# Patient Record
Sex: FEMALE | Race: WHITE | HISPANIC OR LATINO | URBAN - METROPOLITAN AREA
[De-identification: names, ages, dates, MRNs, and addresses within clinical notes are randomized per-mention and may not be internally consistent; named-entity substitution may affect disease eponyms.]

---

## 2023-04-07 PROBLEM — Z00.00 ENCOUNTER FOR PREVENTIVE HEALTH EXAMINATION: Status: ACTIVE | Noted: 2023-04-07

## 2024-04-03 ENCOUNTER — INPATIENT (INPATIENT)
Facility: HOSPITAL | Age: 79
LOS: 2 days | Discharge: HOME | DRG: 194 | End: 2024-04-06
Attending: STUDENT IN AN ORGANIZED HEALTH CARE EDUCATION/TRAINING PROGRAM | Admitting: STUDENT IN AN ORGANIZED HEALTH CARE EDUCATION/TRAINING PROGRAM
Payer: MEDICARE

## 2024-04-03 VITALS
WEIGHT: 134.48 LBS | DIASTOLIC BLOOD PRESSURE: 93 MMHG | SYSTOLIC BLOOD PRESSURE: 159 MMHG | HEART RATE: 54 BPM | TEMPERATURE: 98 F | RESPIRATION RATE: 18 BRPM | OXYGEN SATURATION: 97 %

## 2024-04-03 DIAGNOSIS — S22.39XA FRACTURE OF ONE RIB, UNSPECIFIED SIDE, INITIAL ENCOUNTER FOR CLOSED FRACTURE: ICD-10-CM

## 2024-04-03 DIAGNOSIS — E78.5 HYPERLIPIDEMIA, UNSPECIFIED: ICD-10-CM

## 2024-04-03 DIAGNOSIS — I10 ESSENTIAL (PRIMARY) HYPERTENSION: ICD-10-CM

## 2024-04-03 DIAGNOSIS — F41.9 ANXIETY DISORDER, UNSPECIFIED: ICD-10-CM

## 2024-04-03 DIAGNOSIS — Z94.81 BONE MARROW TRANSPLANT STATUS: Chronic | ICD-10-CM

## 2024-04-03 DIAGNOSIS — S22.49XA MULTIPLE FRACTURES OF RIBS, UNSPECIFIED SIDE, INITIAL ENCOUNTER FOR CLOSED FRACTURE: ICD-10-CM

## 2024-04-03 DIAGNOSIS — Z29.9 ENCOUNTER FOR PROPHYLACTIC MEASURES, UNSPECIFIED: ICD-10-CM

## 2024-04-03 DIAGNOSIS — J18.9 PNEUMONIA, UNSPECIFIED ORGANISM: ICD-10-CM

## 2024-04-03 DIAGNOSIS — F03.90 UNSPECIFIED DEMENTIA WITHOUT BEHAVIORAL DISTURBANCE: ICD-10-CM

## 2024-04-03 LAB
ALBUMIN SERPL ELPH-MCNC: 3.5 G/DL — SIGNIFICANT CHANGE UP (ref 3.5–5)
ALP SERPL-CCNC: 104 U/L — SIGNIFICANT CHANGE UP (ref 40–120)
ALT FLD-CCNC: 24 U/L DA — SIGNIFICANT CHANGE UP (ref 10–60)
ANION GAP SERPL CALC-SCNC: 4 MMOL/L — LOW (ref 5–17)
AST SERPL-CCNC: 19 U/L — SIGNIFICANT CHANGE UP (ref 10–40)
BASOPHILS # BLD AUTO: 0.07 K/UL — SIGNIFICANT CHANGE UP (ref 0–0.2)
BASOPHILS NFR BLD AUTO: 0.5 % — SIGNIFICANT CHANGE UP (ref 0–2)
BILIRUB SERPL-MCNC: 0.7 MG/DL — SIGNIFICANT CHANGE UP (ref 0.2–1.2)
BUN SERPL-MCNC: 21 MG/DL — HIGH (ref 7–18)
CALCIUM SERPL-MCNC: 9.7 MG/DL — SIGNIFICANT CHANGE UP (ref 8.4–10.5)
CHLORIDE SERPL-SCNC: 102 MMOL/L — SIGNIFICANT CHANGE UP (ref 96–108)
CK SERPL-CCNC: 61 U/L — SIGNIFICANT CHANGE UP (ref 21–215)
CO2 SERPL-SCNC: 29 MMOL/L — SIGNIFICANT CHANGE UP (ref 22–31)
CREAT SERPL-MCNC: 0.94 MG/DL — SIGNIFICANT CHANGE UP (ref 0.5–1.3)
EGFR: 61 ML/MIN/1.73M2 — SIGNIFICANT CHANGE UP
EOSINOPHIL # BLD AUTO: 0.04 K/UL — SIGNIFICANT CHANGE UP (ref 0–0.5)
EOSINOPHIL NFR BLD AUTO: 0.3 % — SIGNIFICANT CHANGE UP (ref 0–6)
GLUCOSE SERPL-MCNC: 105 MG/DL — HIGH (ref 70–99)
HCT VFR BLD CALC: 44.6 % — SIGNIFICANT CHANGE UP (ref 34.5–45)
HGB BLD-MCNC: 15 G/DL — SIGNIFICANT CHANGE UP (ref 11.5–15.5)
IMM GRANULOCYTES NFR BLD AUTO: 0.3 % — SIGNIFICANT CHANGE UP (ref 0–0.9)
LYMPHOCYTES # BLD AUTO: 25.3 % — SIGNIFICANT CHANGE UP (ref 13–44)
LYMPHOCYTES # BLD AUTO: 3.76 K/UL — HIGH (ref 1–3.3)
MCHC RBC-ENTMCNC: 31.6 PG — SIGNIFICANT CHANGE UP (ref 27–34)
MCHC RBC-ENTMCNC: 33.6 GM/DL — SIGNIFICANT CHANGE UP (ref 32–36)
MCV RBC AUTO: 94.1 FL — SIGNIFICANT CHANGE UP (ref 80–100)
MONOCYTES # BLD AUTO: 1.36 K/UL — HIGH (ref 0–0.9)
MONOCYTES NFR BLD AUTO: 9.1 % — SIGNIFICANT CHANGE UP (ref 2–14)
NEUTROPHILS # BLD AUTO: 9.59 K/UL — HIGH (ref 1.8–7.4)
NEUTROPHILS NFR BLD AUTO: 64.5 % — SIGNIFICANT CHANGE UP (ref 43–77)
NRBC # BLD: 0 /100 WBCS — SIGNIFICANT CHANGE UP (ref 0–0)
PLATELET # BLD AUTO: 286 K/UL — SIGNIFICANT CHANGE UP (ref 150–400)
POTASSIUM SERPL-MCNC: 4.2 MMOL/L — SIGNIFICANT CHANGE UP (ref 3.5–5.3)
POTASSIUM SERPL-SCNC: 4.2 MMOL/L — SIGNIFICANT CHANGE UP (ref 3.5–5.3)
PROT SERPL-MCNC: 8 G/DL — SIGNIFICANT CHANGE UP (ref 6–8.3)
RBC # BLD: 4.74 M/UL — SIGNIFICANT CHANGE UP (ref 3.8–5.2)
RBC # FLD: 15 % — HIGH (ref 10.3–14.5)
SODIUM SERPL-SCNC: 135 MMOL/L — SIGNIFICANT CHANGE UP (ref 135–145)
TROPONIN I, HIGH SENSITIVITY RESULT: 10.8 NG/L — SIGNIFICANT CHANGE UP
WBC # BLD: 14.87 K/UL — HIGH (ref 3.8–10.5)
WBC # FLD AUTO: 14.87 K/UL — HIGH (ref 3.8–10.5)

## 2024-04-03 PROCEDURE — 71250 CT THORAX DX C-: CPT | Mod: 26,MC

## 2024-04-03 PROCEDURE — 99223 1ST HOSP IP/OBS HIGH 75: CPT | Mod: GC,AI

## 2024-04-03 PROCEDURE — 72125 CT NECK SPINE W/O DYE: CPT | Mod: 26,MC

## 2024-04-03 PROCEDURE — 99285 EMERGENCY DEPT VISIT HI MDM: CPT

## 2024-04-03 PROCEDURE — 70450 CT HEAD/BRAIN W/O DYE: CPT | Mod: 26,MC

## 2024-04-03 RX ORDER — ENOXAPARIN SODIUM 100 MG/ML
40 INJECTION SUBCUTANEOUS EVERY 24 HOURS
Refills: 0 | Status: DISCONTINUED | OUTPATIENT
Start: 2024-04-03 | End: 2024-04-06

## 2024-04-03 RX ORDER — ACETAMINOPHEN 500 MG
1000 TABLET ORAL ONCE
Refills: 0 | Status: COMPLETED | OUTPATIENT
Start: 2024-04-03 | End: 2024-04-03

## 2024-04-03 RX ORDER — METOPROLOL TARTRATE 50 MG
12.5 TABLET ORAL EVERY 12 HOURS
Refills: 0 | Status: DISCONTINUED | OUTPATIENT
Start: 2024-04-03 | End: 2024-04-03

## 2024-04-03 RX ORDER — LIDOCAINE 4 G/100G
1 CREAM TOPICAL DAILY
Refills: 0 | Status: DISCONTINUED | OUTPATIENT
Start: 2024-04-03 | End: 2024-04-06

## 2024-04-03 RX ORDER — OXYCODONE HYDROCHLORIDE 5 MG/1
5 TABLET ORAL EVERY 4 HOURS
Refills: 0 | Status: DISCONTINUED | OUTPATIENT
Start: 2024-04-03 | End: 2024-04-05

## 2024-04-03 RX ORDER — OXYCODONE HYDROCHLORIDE 5 MG/1
2.5 TABLET ORAL EVERY 4 HOURS
Refills: 0 | Status: DISCONTINUED | OUTPATIENT
Start: 2024-04-03 | End: 2024-04-04

## 2024-04-03 RX ORDER — AZITHROMYCIN 500 MG/1
500 TABLET, FILM COATED ORAL ONCE
Refills: 0 | Status: COMPLETED | OUTPATIENT
Start: 2024-04-03 | End: 2024-04-03

## 2024-04-03 RX ORDER — POLYETHYLENE GLYCOL 3350 17 G/17G
17 POWDER, FOR SOLUTION ORAL DAILY
Refills: 0 | Status: DISCONTINUED | OUTPATIENT
Start: 2024-04-03 | End: 2024-04-06

## 2024-04-03 RX ORDER — METOPROLOL TARTRATE 50 MG
12.5 TABLET ORAL EVERY 12 HOURS
Refills: 0 | Status: DISCONTINUED | OUTPATIENT
Start: 2024-04-03 | End: 2024-04-06

## 2024-04-03 RX ORDER — NALOXONE HYDROCHLORIDE 4 MG/.1ML
0.4 SPRAY NASAL ONCE
Refills: 0 | Status: DISCONTINUED | OUTPATIENT
Start: 2024-04-03 | End: 2024-04-06

## 2024-04-03 RX ORDER — CYCLOBENZAPRINE HYDROCHLORIDE 10 MG/1
5 TABLET, FILM COATED ORAL EVERY 8 HOURS
Refills: 0 | Status: DISCONTINUED | OUTPATIENT
Start: 2024-04-03 | End: 2024-04-03

## 2024-04-03 RX ORDER — SENNA PLUS 8.6 MG/1
2 TABLET ORAL AT BEDTIME
Refills: 0 | Status: DISCONTINUED | OUTPATIENT
Start: 2024-04-03 | End: 2024-04-06

## 2024-04-03 RX ORDER — ATORVASTATIN CALCIUM 80 MG/1
40 TABLET, FILM COATED ORAL AT BEDTIME
Refills: 0 | Status: DISCONTINUED | OUTPATIENT
Start: 2024-04-03 | End: 2024-04-06

## 2024-04-03 RX ORDER — ACETAMINOPHEN 500 MG
1000 TABLET ORAL EVERY 8 HOURS
Refills: 0 | Status: DISCONTINUED | OUTPATIENT
Start: 2024-04-03 | End: 2024-04-06

## 2024-04-03 RX ORDER — CEFTRIAXONE 500 MG/1
1000 INJECTION, POWDER, FOR SOLUTION INTRAMUSCULAR; INTRAVENOUS EVERY 24 HOURS
Refills: 0 | Status: DISCONTINUED | OUTPATIENT
Start: 2024-04-04 | End: 2024-04-06

## 2024-04-03 RX ORDER — INSULIN LISPRO 100/ML
VIAL (ML) SUBCUTANEOUS
Refills: 0 | Status: DISCONTINUED | OUTPATIENT
Start: 2024-04-03 | End: 2024-04-03

## 2024-04-03 RX ORDER — AZITHROMYCIN 500 MG/1
500 TABLET, FILM COATED ORAL EVERY 24 HOURS
Refills: 0 | Status: DISCONTINUED | OUTPATIENT
Start: 2024-04-04 | End: 2024-04-06

## 2024-04-03 RX ORDER — SODIUM CHLORIDE 9 MG/ML
1000 INJECTION, SOLUTION INTRAVENOUS
Refills: 0 | Status: DISCONTINUED | OUTPATIENT
Start: 2024-04-03 | End: 2024-04-06

## 2024-04-03 RX ORDER — CEFTRIAXONE 500 MG/1
1000 INJECTION, POWDER, FOR SOLUTION INTRAMUSCULAR; INTRAVENOUS ONCE
Refills: 0 | Status: COMPLETED | OUTPATIENT
Start: 2024-04-03 | End: 2024-04-03

## 2024-04-03 RX ORDER — MORPHINE SULFATE 50 MG/1
2 CAPSULE, EXTENDED RELEASE ORAL ONCE
Refills: 0 | Status: DISCONTINUED | OUTPATIENT
Start: 2024-04-03 | End: 2024-04-03

## 2024-04-03 RX ADMIN — Medication 400 MILLIGRAM(S): at 16:49

## 2024-04-03 RX ADMIN — Medication 1000 MILLIGRAM(S): at 16:50

## 2024-04-03 RX ADMIN — CEFTRIAXONE 100 MILLIGRAM(S): 500 INJECTION, POWDER, FOR SOLUTION INTRAMUSCULAR; INTRAVENOUS at 18:33

## 2024-04-03 RX ADMIN — MORPHINE SULFATE 2 MILLIGRAM(S): 50 CAPSULE, EXTENDED RELEASE ORAL at 18:50

## 2024-04-03 RX ADMIN — Medication 1000 MILLIGRAM(S): at 22:50

## 2024-04-03 RX ADMIN — MORPHINE SULFATE 2 MILLIGRAM(S): 50 CAPSULE, EXTENDED RELEASE ORAL at 18:32

## 2024-04-03 RX ADMIN — AZITHROMYCIN 255 MILLIGRAM(S): 500 TABLET, FILM COATED ORAL at 18:33

## 2024-04-03 RX ADMIN — ATORVASTATIN CALCIUM 40 MILLIGRAM(S): 80 TABLET, FILM COATED ORAL at 22:50

## 2024-04-03 RX ADMIN — SENNA PLUS 2 TABLET(S): 8.6 TABLET ORAL at 22:50

## 2024-04-03 NOTE — H&P ADULT - PROBLEM SELECTOR PLAN 3
History of hypertension  As per HHA patient takes metoprolol 25 mg daily  Will hold home medications for now  Primary team to confirm home meds

## 2024-04-03 NOTE — ED ADULT TRIAGE NOTE - LOCATION:
Pt arrives to ED with a cough and watery eyes that began on Sunday, pt thinks it's allergy related but wants to make sure she doesn't have covid, pt denies fever, sob, N/V/D or loss of taste/smell  
Left arm;

## 2024-04-03 NOTE — H&P ADULT - NSHPPHYSICALEXAM_GEN_ALL_CORE
GENERAL: NAD, well-groomed, well-developed, elderly female  HEAD:  Atraumatic, Normocephalic  EYES: EOMI, PERRLA, conjunctiva and sclera clear  ENMT: No tonsillar erythema, exudates, or enlargement; Moist mucous membranes, Good dentition, No lesions  NECK: Supple, normal appearance, No JVD; Normal thyroid; Trachea midline  NERVOUS SYSTEM:  Alert & Oriented X3,  Motor Strength 5/5 B/L upper and lower extremities, sensation intact  CHEST/LUNG: Lungs with crackles and lung fields bilaterally, No rales, rhonchi, wheezing, Tenderness to palpation in the posterior and lateral left sided rib cage  HEART: Regular rate and rhythm; No murmurs, rubs, or gallops  ABDOMEN: Soft, Nontender, Nondistended; Bowel sounds present  EXTREMITIES:  2+ Peripheral Pulses, No clubbing, cyanosis, or edema  LYMPH: No lymphadenopathy noted  SKIN: No rashes or lesions;  Good capillary refill

## 2024-04-03 NOTE — H&P ADULT - PROBLEM SELECTOR PLAN 1
p/w cough, Sputum production, WBC 14  CT Chest shows Multifocal subsegmental consolidative changes with more prominent changes in the left lower lobe with associated areas of mucous plugging which although may be chronic in nature.   s/p Ceftriaxone, azithromycin, Tylenol, morphine in the ED  Start azithromycin 500mg qd + rocephin 1g qd  Send strep ag, legionella, RVP, procalcitonin, mycoplasma igm  tylenol prn  Oxygen supplementation as needed  Obtain ambulatory O2 sat

## 2024-04-03 NOTE — H&P ADULT - PROBLEM SELECTOR PLAN 4
History of hyperlipidemia  As per HHA patient takes atorvastatin 40 mg daily  Continue with home meds  Primary team to confirm home meds  Diet DASH

## 2024-04-03 NOTE — H&P ADULT - NSHPREVIEWOFSYSTEMS_GEN_ALL_CORE
CONSTITUTIONAL: No fever, weight loss, or fatigue  EYES: No eye pain, visual disturbances, or discharge  ENT:  No difficulty hearing, tinnitus, vertigo; No sinus or throat pain  NECK: No pain or stiffness  RESPIRATORY: No cough, wheezing, chills or hemoptysis; No Shortness of Breath, Difficulty breathing due to pain  CARDIOVASCULAR: No chest pain, palpitations, passing out, dizziness, or leg swelling  GASTROINTESTINAL: No abdominal or epigastric pain. No nausea, vomiting, or hematemesis; No diarrhea or constipation. No melena or hematochezia.  GENITOURINARY: No dysuria, frequency, hematuria, or incontinence  NEUROLOGICAL: No headaches, memory loss, loss of strength, numbness, or tremors  SKIN: No itching, burning, rashes, or lesions   LYMPH Nodes: No enlarged glands  ENDOCRINE: No heat or cold intolerance; No hair loss  MUSCULOSKELETAL: No joint pain or swelling; No muscle, back, No extremity pain, Left posterior and lateral rib cage pain  PSYCHIATRIC: No depression, anxiety, mood swings, or difficulty sleeping  HEME/LYMPH: No easy bruising, or bleeding gums  ALLERGY AND IMMUNOLOGIC: No hives or eczema

## 2024-04-03 NOTE — ED PROVIDER NOTE - CARE PLAN
1 Principal Discharge DX:	Chest wall pain   Principal Discharge DX:	Rib fractures  Secondary Diagnosis:	Pneumonia  Secondary Diagnosis:	Hypoxia  Secondary Diagnosis:	Intractable pain

## 2024-04-03 NOTE — ED PROVIDER NOTE - PROGRESS NOTE DETAILS
3 displaced rib fx, O2 sat 92% on room air, pneumonia on CT scan. admit for IV antibiotics pain control, spirometery

## 2024-04-03 NOTE — ED ADULT NURSE NOTE - NSFALLHARMRISKINTERV_ED_ALL_ED

## 2024-04-03 NOTE — H&P ADULT - PROBLEM SELECTOR PLAN 5
History of anxiety and depression  HHA unaware of medications at this time  Primary team to confirm home meds

## 2024-04-03 NOTE — H&P ADULT - HISTORY OF PRESENT ILLNESS
79-year-old female from home, ambulates with a walker at baseline, with HHA 10 hours/day x 7 days with PMH of HTN HLD depression presents to the ED complaining of left-sided rib cage pain s/p a fall out of bed 4 days ago.  Patient states the she fell out of bed while rolling out on Friday.  She has been having severe rib cage pain since then.  She complains of difficulty breathing due to pain.  She was able to get herself up.  As per HHA, patient has been having coughing, sputum production since Friday as well.  She denies any fever, headache, dizziness, chest pain, palpitation, nausea, vomiting, diarrhea, weakness, tingling, numbness.  In the ED, patient is comfortable, NAD, VSS, patient was found to have tenderness on palpation of the left posterior and lateral rib cage, saturating well on room air, patient was found to have WBC 14.8, CT head and cervical spine are unremarkable.  CT chest shows Displaced posterior eighth through 10th left left-sided rib fractures. No pneumothorax. Small left pleural effusion. Multifocal subsegmental consolidative changes with more prominent changes in the left lower lobe with associated areas of mucous plugging which although may be chronic in nature.  Patient received azithromycin, ceftriaxone, Tylenol, morphine in the ED.  Patient is admitted for pneumonia, acute rib fracture.

## 2024-04-03 NOTE — ED ADULT NURSE REASSESSMENT NOTE - NS ED NURSE REASSESS COMMENT FT1
Patient is now on 2L O2 supplemental with good outcome. Pt is educated on how to use incentive spirometer efficiently with positive return demonstration.

## 2024-04-03 NOTE — ED PROVIDER NOTE - OBJECTIVE STATEMENT
79-year-old female with high blood pressure and diabetes presents with left-sided rib cage pain after a fall out of bed 4 days ago.  Patient has home attendant 7 days a week 10 hours a day and on Friday when she was alone she rolled out of bed landing on her left side.  She was able to get up by herself.  Since then has been having this pain and home attendant noticed that for the last 2 days she has not really eaten anything and is producing the sputum when she coughs.  No fever no chills.

## 2024-04-03 NOTE — ED PROVIDER NOTE - PHYSICAL EXAMINATION
Heart regular lungs clear abdomen soft nontender left anterior lateral and posterior rib tenderness to palpation.  No visible bruising or crepitus.  Abdomen nontender no hip tenderness palpation no visible bruising on the head.  No midline spinal tenderness palpation.  Nonfocal neuroexam strength sensation intact in both arms and legs.  Patient seems to be splinting the left side.

## 2024-04-03 NOTE — ED PROVIDER NOTE - CLINICAL SUMMARY MEDICAL DECISION MAKING FREE TEXT BOX
Patient with left-sided rib pain after fall out of bed on Friday of last week.  Will do labs CAT scan pain control reassess.

## 2024-04-03 NOTE — H&P ADULT - TIME BILLING
history, physical, lab review. reviewed CT chest personally. Spoke w/ patient's HHA for collateral hx.

## 2024-04-04 DIAGNOSIS — R07.89 OTHER CHEST PAIN: ICD-10-CM

## 2024-04-04 DIAGNOSIS — S22.42XA MULTIPLE FRACTURES OF RIBS, LEFT SIDE, INITIAL ENCOUNTER FOR CLOSED FRACTURE: ICD-10-CM

## 2024-04-04 DIAGNOSIS — Z02.9 ENCOUNTER FOR ADMINISTRATIVE EXAMINATIONS, UNSPECIFIED: ICD-10-CM

## 2024-04-04 DIAGNOSIS — R07.81 PLEURODYNIA: ICD-10-CM

## 2024-04-04 LAB
ALBUMIN SERPL ELPH-MCNC: 3.2 G/DL — LOW (ref 3.5–5)
ALP SERPL-CCNC: 92 U/L — SIGNIFICANT CHANGE UP (ref 40–120)
ALT FLD-CCNC: 20 U/L DA — SIGNIFICANT CHANGE UP (ref 10–60)
ANION GAP SERPL CALC-SCNC: 4 MMOL/L — LOW (ref 5–17)
AST SERPL-CCNC: 15 U/L — SIGNIFICANT CHANGE UP (ref 10–40)
B PERT DNA SPEC QL NAA+PROBE: SIGNIFICANT CHANGE UP
BASOPHILS # BLD AUTO: 0.05 K/UL — SIGNIFICANT CHANGE UP (ref 0–0.2)
BASOPHILS NFR BLD AUTO: 0.5 % — SIGNIFICANT CHANGE UP (ref 0–2)
BILIRUB SERPL-MCNC: 0.8 MG/DL — SIGNIFICANT CHANGE UP (ref 0.2–1.2)
BUN SERPL-MCNC: 15 MG/DL — SIGNIFICANT CHANGE UP (ref 7–18)
C PNEUM DNA SPEC QL NAA+PROBE: SIGNIFICANT CHANGE UP
CALCIUM SERPL-MCNC: 8.7 MG/DL — SIGNIFICANT CHANGE UP (ref 8.4–10.5)
CHLORIDE SERPL-SCNC: 101 MMOL/L — SIGNIFICANT CHANGE UP (ref 96–108)
CO2 SERPL-SCNC: 29 MMOL/L — SIGNIFICANT CHANGE UP (ref 22–31)
CREAT SERPL-MCNC: 0.75 MG/DL — SIGNIFICANT CHANGE UP (ref 0.5–1.3)
EGFR: 80 ML/MIN/1.73M2 — SIGNIFICANT CHANGE UP
EOSINOPHIL # BLD AUTO: 0.16 K/UL — SIGNIFICANT CHANGE UP (ref 0–0.5)
EOSINOPHIL NFR BLD AUTO: 1.4 % — SIGNIFICANT CHANGE UP (ref 0–6)
FLUAV H1 2009 PAND RNA SPEC QL NAA+PROBE: SIGNIFICANT CHANGE UP
FLUAV H1 RNA SPEC QL NAA+PROBE: SIGNIFICANT CHANGE UP
FLUAV H3 RNA SPEC QL NAA+PROBE: SIGNIFICANT CHANGE UP
FLUAV SUBTYP SPEC NAA+PROBE: SIGNIFICANT CHANGE UP
FLUBV RNA SPEC QL NAA+PROBE: SIGNIFICANT CHANGE UP
GLUCOSE SERPL-MCNC: 110 MG/DL — HIGH (ref 70–99)
HADV DNA SPEC QL NAA+PROBE: SIGNIFICANT CHANGE UP
HCOV PNL SPEC NAA+PROBE: SIGNIFICANT CHANGE UP
HCT VFR BLD CALC: 43.3 % — SIGNIFICANT CHANGE UP (ref 34.5–45)
HGB BLD-MCNC: 14.6 G/DL — SIGNIFICANT CHANGE UP (ref 11.5–15.5)
HMPV RNA SPEC QL NAA+PROBE: SIGNIFICANT CHANGE UP
HPIV1 RNA SPEC QL NAA+PROBE: SIGNIFICANT CHANGE UP
HPIV2 RNA SPEC QL NAA+PROBE: SIGNIFICANT CHANGE UP
HPIV3 RNA SPEC QL NAA+PROBE: SIGNIFICANT CHANGE UP
HPIV4 RNA SPEC QL NAA+PROBE: SIGNIFICANT CHANGE UP
IMM GRANULOCYTES NFR BLD AUTO: 0.5 % — SIGNIFICANT CHANGE UP (ref 0–0.9)
LYMPHOCYTES # BLD AUTO: 2.91 K/UL — SIGNIFICANT CHANGE UP (ref 1–3.3)
LYMPHOCYTES # BLD AUTO: 26.3 % — SIGNIFICANT CHANGE UP (ref 13–44)
M PNEUMO IGM SER-ACNC: 0.37 INDEX — SIGNIFICANT CHANGE UP (ref 0–0.9)
MAGNESIUM SERPL-MCNC: 1.7 MG/DL — SIGNIFICANT CHANGE UP (ref 1.6–2.6)
MCHC RBC-ENTMCNC: 32.3 PG — SIGNIFICANT CHANGE UP (ref 27–34)
MCHC RBC-ENTMCNC: 33.7 GM/DL — SIGNIFICANT CHANGE UP (ref 32–36)
MCV RBC AUTO: 95.8 FL — SIGNIFICANT CHANGE UP (ref 80–100)
MONOCYTES # BLD AUTO: 1.25 K/UL — HIGH (ref 0–0.9)
MONOCYTES NFR BLD AUTO: 11.3 % — SIGNIFICANT CHANGE UP (ref 2–14)
MRSA PCR RESULT.: SIGNIFICANT CHANGE UP
MYCOPLASMA AG SPEC QL: NEGATIVE — SIGNIFICANT CHANGE UP
NEUTROPHILS # BLD AUTO: 6.64 K/UL — SIGNIFICANT CHANGE UP (ref 1.8–7.4)
NEUTROPHILS NFR BLD AUTO: 60 % — SIGNIFICANT CHANGE UP (ref 43–77)
NRBC # BLD: 0 /100 WBCS — SIGNIFICANT CHANGE UP (ref 0–0)
PHOSPHATE SERPL-MCNC: 3 MG/DL — SIGNIFICANT CHANGE UP (ref 2.5–4.5)
PLATELET # BLD AUTO: 279 K/UL — SIGNIFICANT CHANGE UP (ref 150–400)
POTASSIUM SERPL-MCNC: 3.4 MMOL/L — LOW (ref 3.5–5.3)
POTASSIUM SERPL-SCNC: 3.4 MMOL/L — LOW (ref 3.5–5.3)
PROT SERPL-MCNC: 7.1 G/DL — SIGNIFICANT CHANGE UP (ref 6–8.3)
RAPID RVP RESULT: SIGNIFICANT CHANGE UP
RBC # BLD: 4.52 M/UL — SIGNIFICANT CHANGE UP (ref 3.8–5.2)
RBC # FLD: 15.1 % — HIGH (ref 10.3–14.5)
RSV RNA SPEC QL NAA+PROBE: SIGNIFICANT CHANGE UP
RV+EV RNA SPEC QL NAA+PROBE: SIGNIFICANT CHANGE UP
S AUREUS DNA NOSE QL NAA+PROBE: SIGNIFICANT CHANGE UP
SARS-COV-2 RNA SPEC QL NAA+PROBE: SIGNIFICANT CHANGE UP
SODIUM SERPL-SCNC: 134 MMOL/L — LOW (ref 135–145)
WBC # BLD: 11.06 K/UL — HIGH (ref 3.8–10.5)
WBC # FLD AUTO: 11.06 K/UL — HIGH (ref 3.8–10.5)

## 2024-04-04 PROCEDURE — 99223 1ST HOSP IP/OBS HIGH 75: CPT | Mod: 57

## 2024-04-04 PROCEDURE — 99232 SBSQ HOSP IP/OBS MODERATE 35: CPT | Mod: FS

## 2024-04-04 PROCEDURE — 99222 1ST HOSP IP/OBS MODERATE 55: CPT

## 2024-04-04 RX ORDER — POTASSIUM CHLORIDE 20 MEQ
20 PACKET (EA) ORAL ONCE
Refills: 0 | Status: COMPLETED | OUTPATIENT
Start: 2024-04-04 | End: 2024-04-04

## 2024-04-04 RX ADMIN — AZITHROMYCIN 255 MILLIGRAM(S): 500 TABLET, FILM COATED ORAL at 18:00

## 2024-04-04 RX ADMIN — ATORVASTATIN CALCIUM 40 MILLIGRAM(S): 80 TABLET, FILM COATED ORAL at 21:44

## 2024-04-04 RX ADMIN — OXYCODONE HYDROCHLORIDE 5 MILLIGRAM(S): 5 TABLET ORAL at 11:38

## 2024-04-04 RX ADMIN — POLYETHYLENE GLYCOL 3350 17 GRAM(S): 17 POWDER, FOR SOLUTION ORAL at 12:47

## 2024-04-04 RX ADMIN — Medication 1000 MILLIGRAM(S): at 06:32

## 2024-04-04 RX ADMIN — Medication 1000 MILLIGRAM(S): at 14:11

## 2024-04-04 RX ADMIN — Medication 20 MILLIEQUIVALENT(S): at 09:54

## 2024-04-04 RX ADMIN — Medication 1000 MILLIGRAM(S): at 15:11

## 2024-04-04 RX ADMIN — SODIUM CHLORIDE 75 MILLILITER(S): 9 INJECTION, SOLUTION INTRAVENOUS at 07:51

## 2024-04-04 RX ADMIN — Medication 12.5 MILLIGRAM(S): at 06:01

## 2024-04-04 RX ADMIN — SENNA PLUS 2 TABLET(S): 8.6 TABLET ORAL at 21:44

## 2024-04-04 RX ADMIN — Medication 12.5 MILLIGRAM(S): at 18:22

## 2024-04-04 RX ADMIN — OXYCODONE HYDROCHLORIDE 5 MILLIGRAM(S): 5 TABLET ORAL at 18:05

## 2024-04-04 RX ADMIN — ENOXAPARIN SODIUM 40 MILLIGRAM(S): 100 INJECTION SUBCUTANEOUS at 06:01

## 2024-04-04 RX ADMIN — Medication 1000 MILLIGRAM(S): at 01:35

## 2024-04-04 RX ADMIN — OXYCODONE HYDROCHLORIDE 5 MILLIGRAM(S): 5 TABLET ORAL at 10:40

## 2024-04-04 RX ADMIN — Medication 1000 MILLIGRAM(S): at 21:44

## 2024-04-04 RX ADMIN — Medication 1000 MILLIGRAM(S): at 06:02

## 2024-04-04 RX ADMIN — LIDOCAINE 1 PATCH: 4 CREAM TOPICAL at 19:11

## 2024-04-04 RX ADMIN — CEFTRIAXONE 100 MILLIGRAM(S): 500 INJECTION, POWDER, FOR SOLUTION INTRAMUSCULAR; INTRAVENOUS at 17:59

## 2024-04-04 RX ADMIN — LIDOCAINE 1 PATCH: 4 CREAM TOPICAL at 12:46

## 2024-04-04 RX ADMIN — Medication 1000 MILLIGRAM(S): at 23:11

## 2024-04-04 RX ADMIN — OXYCODONE HYDROCHLORIDE 5 MILLIGRAM(S): 5 TABLET ORAL at 17:03

## 2024-04-04 NOTE — CHART NOTE - NSCHARTNOTEFT_GEN_A_CORE
Upon review of chart, it noted that patient does not have a PCP on chart or preferred pharmacy to guide us with medication reconciliation. Patient is A&Ox1-2, with underlying dementia. Unable to provide collateral information. I called patient's emergency contact Bam @ [phone], Demetria endorses that she is unaware of pt's PCP or pharmacy rather she asked me to call pt's HHA Alyson @ [phone]. Alyson also endorses tat she is not familiar with pt's medication nor her preferred pharmacy.

## 2024-04-04 NOTE — PATIENT PROFILE ADULT - FUNCTIONAL SCREEN CURRENT LEVEL: COMMUNICATION, MLM
Emergency Department Provider Note       PCP: KAYLEE Moreland NP   Age: 27 y.o. Sex: female     DISPOSITION Decision To Discharge 10/16/2023 02:40:49 PM       ICD-10-CM    1. Acute viral syndrome  B34.9       2. Thrombocytopenia ([address])  D69.6           Medical Decision Making     Complexity of Problems Addressed:  1 stable acute illness    Data Reviewed and Analyzed:  I independently ordered and reviewed each unique test.             Discussion of management or test interpretation. Patient presents with 3 days of generalized myalgias. Patient states that these symptoms resulted in a fall this morning with no resultant injuries, head trauma or loss of consciousness. Denies any known sick contacts. She denies any infectious flulike symptoms. Patient arrives in no acute distress. Vitals notable for temp of 100.1, pulse of 113. I suspect viral syndrome. Will check basic labs to rule out metabolic derangement, anemia. Will check viral swabs. Labs overall reassuring. Does have a mild thrombocytopenia with 90,000 compared to prior labs about 9 months prior. Patient denies any episodes of bleeding and there is no observable petechial rash or purpura. Patient does also have evidence of a mild acute kidney injury with creatinine of 1.4/GFR 51, most likely from diminished p.o. In the setting of her current illness. Viral syndrome remains leading differential. Thrombocytopenia thought secondary to viral cascade. Did not believe admission was necessary given no reported bleeding or purpura and mild degree of thrombocytopenia. Do believe patient can be discharged and arrange follow-up with primary provider within 2-3 days for reevaluation. Patient advised to return with any new or worsening symptoms. Patient understanding and agreeable.       Risk of Complications and/or Morbidity of Patient Management:           History       Patient is a 78-year-old female with no significant past medical history
2 = difficulty understanding and speaking (not related to language barrier)

## 2024-04-04 NOTE — CONSULT NOTE ADULT - ASSESSMENT
78 y/o female with displaced posterior 8th-10th left sided rib fractures, no pneumothorax, small left effusion, pneumonia   -pain management for pain control  -abx  -no acute thoracic surgical intervention   -care per primary   -discussed with Dr. Gallegos who agrees    This note and all of its recommendations herein are preliminary until co-signed by an attending physician 
Confidential Drug Utilization Report  Search Terms: Chelsea Pollock, 1945Search Date: 04/04/2024 11:55:39 AM  The Drug Utilization Report below displays all of the controlled substance prescriptions, if any, that your patient has filled in the last twelve months. The information displayed on this report is compiled from pharmacy submissions to the Department, and accurately reflects the information as submitted by the pharmacies.    This report was requested by: Starr Dwyer | Reference #: [id number]    There are no results for the search terms that you entered.

## 2024-04-04 NOTE — CONSULT NOTE ADULT - NS ATTEND AMEND GEN_ALL_CORE FT
patient with rib fractures minimally displaced laying in bed in no distress.   recommend pain control and incentive spirometer. no indication for vats or rib plating.   no acute thoracic intervention, reconsult prn

## 2024-04-04 NOTE — PROGRESS NOTE ADULT - NS ATTEND AMEND GEN_ALL_CORE FT
1-year-old Slovenian speaking  female from home, ambulates with a walker at baseline, with HHA 10 hours/day x 7 days with PMH of HTN, HLD, depression and Alzheimer's dementia   presents to the ED complaining of left-sided rib cage pain s/p a fall out of bed 4 days ago. Patient informs that she is able to walk and carry out other activities but continues to have left sided chest wall pain. She also c/o having cough productive of whitish sputum and shortness of breath, denies any fever, chills, nausea, vomiting, abdominal pain or other complaints. Admitted for Pneumonia and rib fx     Pt feels better today, denies any sob. Pain is better       Labs reviewed- cbc, bmp, RVP panel         A/P:  #Multi-focal pneumonia  #Rib fx- 8th-10th   #HTN  #HLD  #Dementia   #DVT ppx      Plan:  -C/w IV Rocephin x5-7  and Zithromax x3 days    -Check sputum cx, legionella, strept pne, mycoplasma   -Mucinex for mucolytic effect   -Pain control for rib fx, incentive spirometer. Appreciate thoracic surgery consult   -Patient is unsure of her home meds, reports being on metoprolol and statin. c/w low dose B-Blocker. Will attempt to reach PCP- Dr. Man Hong [facility]   -c/w statin   -Spoke w/ patient's family friend Mr. Man Herndon ([phone]) at bedside. He informed that daughter passed away and he was her God-father. He also informed that he is in process of becoming her HCP. He advised to call him for any medical issues, patient was present at that time and agreed.

## 2024-04-04 NOTE — PATIENT PROFILE ADULT - FALL HARM RISK - HARM RISK INTERVENTIONS

## 2024-04-04 NOTE — CONSULT NOTE ADULT - PROBLEM SELECTOR RECOMMENDATION 9
Pt with acute left sided rib pain which is somatic in nature due to left posterior 8-10 rib fractures.   High risk medications reviewed. Avoid polypharmacy. Avoid IV opioids. Avoid NSAIDs and benzodiazepines. Non-pharmacological sleep aides initiated. Non-opioid medications and non-pharmacological pain management measures initiated.    Opioid pain recommendations   - Continue Oxycodone 5 mg PO q 4 hours PRN severe pain. Monitor for sedation/ respiratory depression.   Non-opioid pain recommendations   - Continue Acetaminophen 1 gram PO q 8 hours x 4 days, then PRN moderate pain. Monitor LFTs  - Continue Lidoderm 4% patch daily.   Bowel Regimen  - Continue Miralax 17G PO daily  - Continue Senna 2 tablets at bedtime for constipation  - Continue dulcolax 5mg PO daily PRN constipation  Mild pain (score 1-3)  - Non-pharmacological pain treatment recommendations  - Warm/ Cool packs PRN   - Repositioning, imagery, relaxation, distraction.  - Physical therapy OOB if no contraindications   Recommendations discussed with primary team and RN

## 2024-04-04 NOTE — CONSULT NOTE ADULT - SUBJECTIVE AND OBJECTIVE BOX
GENERAL SURGERY CONSULT NOTE    Patient is a 79y old  Female who presents with a chief complaint of Pneumonia/rib fracture (04 Apr 2024 11:53)      HPI:  79-year-old female from home, ambulates with a walker at baseline, with HHA 10 hours/day x 7 days with PMH of HTN HLD depression presents to the ED complaining of left-sided rib cage pain s/p a fall out of bed 4 days ago.  Patient states the she fell out of bed while rolling out on Friday.  She has been having severe rib cage pain since then.  She complains of difficulty breathing due to pain.  She was able to get herself up.  As per HHA, patient has been having coughing, sputum production since Friday as well.  She denies any fever, headache, dizziness, chest pain, palpitation, nausea, vomiting, diarrhea, weakness, tingling, numbness.  In the ED, patient is comfortable, NAD, VSS, patient was found to have tenderness on palpation of the left posterior and lateral rib cage, saturating well on room air, patient was found to have WBC 14.8, CT head and cervical spine are unremarkable.  CT chest shows Displaced posterior eighth through 10th left left-sided rib fractures. No pneumothorax. Small left pleural effusion. Multifocal subsegmental consolidative changes with more prominent changes in the left lower lobe with associated areas of mucous plugging which although may be chronic in nature.  Patient received azithromycin, ceftriaxone, Tylenol, morphine in the ED.  Patient is admitted for pneumonia, acute rib fracture. (03 Apr 2024 18:59)    thoracic surgery consulted on 80 y/o female presenting s/p fall at home six days ago. Pt seen and examined, family member at bedside, pt reports that she has had continued pain and shortness of breath that has now improved since fall. Denies any surgical history in the chest. Chart and imaging reviewed with Dr. Gallegos. Complaining of pain over the fracture sites, cough with phlegm, improving respiratory status.     PAST MEDICAL & SURGICAL HISTORY:  HTN (hypertension)      HLD (hyperlipidemia)      Anxiety and depression      S/P bone marrow transplant          Review of Systems:    I have reviewed 9 systems with the patient and the only positive findings were above     MEDICATIONS  (STANDING):  acetaminophen     Tablet .. 1000 milliGRAM(s) Oral every 8 hours  atorvastatin 40 milliGRAM(s) Oral at bedtime  azithromycin  IVPB 500 milliGRAM(s) IV Intermittent every 24 hours  cefTRIAXone   IVPB 1000 milliGRAM(s) IV Intermittent every 24 hours  enoxaparin Injectable 40 milliGRAM(s) SubCutaneous every 24 hours  lactated ringers. 1000 milliLiter(s) (75 mL/Hr) IV Continuous <Continuous>  lidocaine   4% Patch 1 Patch Transdermal daily  metoprolol tartrate 12.5 milliGRAM(s) Oral every 12 hours  naloxone Injectable 0.4 milliGRAM(s) IV Push once  polyethylene glycol 3350 17 Gram(s) Oral daily  senna 2 Tablet(s) Oral at bedtime    MEDICATIONS  (PRN):  bisacodyl 5 milliGRAM(s) Oral daily PRN Constipation  guaiFENesin ER 1200 milliGRAM(s) Oral every 12 hours PRN Cough  oxyCODONE    IR 5 milliGRAM(s) Oral every 4 hours PRN Severe Pain (7 - 10)      Allergies    No Known Allergies    Intolerances        SOCIAL HISTORY          Smoking: Yes [ ]  No [ ]   ______pk yrs          ETOH  Yes [ ]  No [ ]  Social [ ]          DRUGS:  Yes [ ]  No [ ]  if so what______________    FAMILY HISTORY:  No pertinent family history in first degree relatives        Vital Signs Last 24 Hrs  T(C): 36.4 (04 Apr 2024 05:05), Max: 36.7 (03 Apr 2024 15:35)  T(F): 97.5 (04 Apr 2024 05:05), Max: 98 (03 Apr 2024 15:35)  HR: 70 (04 Apr 2024 09:32) (57 - 70)  BP: 138/68 (04 Apr 2024 09:32) (129/67 - 157/78)  BP(mean): 81 (04 Apr 2024 02:46) (81 - 81)  RR: 20 (04 Apr 2024 05:05) (18 - 20)  SpO2: 94% (04 Apr 2024 09:32) (92% - 96%)    Parameters below as of 04 Apr 2024 09:32  Patient On (Oxygen Delivery Method): nasal cannula        Physical Exam:    General:  Appears stated age, well-groomed, no distress  Eyes : EOMI   HENT:  WNL, no JVD  Respirations: Unlabored on room air  chest: tender to palpation posterior thorax, palpable lipoma around the area of pain, no overlying skin changes or flail chest noted.   Abdomen: Soft, nondistended, nontender   Extremities: No edema b/l   Neuro: Alert, oriented to time, place and person   Psych:  Normal affect       LABS:                        14.6   11.06 )-----------( 279      ( 04 Apr 2024 06:55 )             43.3     04-04    134<L>  |  101  |  15  ----------------------------<  110<H>  3.4<L>   |  29  |  0.75    Ca    8.7      04 Apr 2024 06:55  Phos  3.0     04-04  Mg     1.7     04-04    TPro  7.1  /  Alb  3.2<L>  /  TBili  0.8  /  DBili  x   /  AST  15  /  ALT  20  /  AlkPhos  92  04-04      Urinalysis Basic - ( 04 Apr 2024 06:55 )    Color: x / Appearance: x / SG: x / pH: x  Gluc: 110 mg/dL / Ketone: x  / Bili: x / Urobili: x   Blood: x / Protein: x / Nitrite: x   Leuk Esterase: x / RBC: x / WBC x   Sq Epi: x / Non Sq Epi: x / Bacteria: x        RADIOLOGY & ADDITIONAL STUDIES:  < from: CT Chest No Cont (04.03.24 @ 16:31) >    ACC: [id number] EXAM:  CT CHEST   ORDERED BY: HERMANN SOMMERS     PROCEDURE DATE:  04/03/2024          INTERPRETATION:  CLINICAL INFORMATION: Left-sided rib pain    COMPARISON: None.    CONTRAST/COMPLICATIONS:  IV Contrast: NONE  Oral Contrast: NONE  Complications: None reported at time of study completion    PROCEDURE:  CT of the Chest was performed.  Sagittal and coronal reformats were performed.    FINDINGS:    LUNGS AND AIRWAYS: Patent central airways.  Subsegmental atelectasis and   or scarring in the medial right upper lobe, lingula and anterior right   lower lobe. Biapical scarring. Subsegmental consolidative changes in the   left lower lobe may represent atelectasis and/or scarring however   recommend comparison to outside imaging and short-term follow-up in 4-6   weeks.  PLEURA: Small left pleural effusion.  MEDIASTINUM AND ARSH: No lymphadenopathy.  VESSELS: Atherosclerotic changes of the aorta and coronary vasculature.  HEART: Heart size is normal. No pericardial effusion.  CHEST WALL AND LOWER NECK: Partially visualized left posterior back   intramuscular lipoma. Subcentimeter thyroid nodule  VISUALIZED UPPER ABDOMEN: Left adrenal adenoma. A few hepatic cysts.   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 Colonic diverticulosis.  BONES: Degenerative changes. Displaced posterior left eighth through 10th   rib fractures.    IMPRESSION:  Displaced posterior eighth through 10th left left-sided rib fractures. No   pneumothorax. Small left pleural effusion.    Multifocal subsegmental consolidative changes with more prominent changes   in the left lower lobe with associated areas of mucous plugging which   although may be chronic in nature, recommend comparison to outside   imaging and short-term follow-up CT in 4-6 weeks.    --- End of Report ---            NGA PAZ MD; Attending Radiologist  This document has been electronically signed. Apr  3 2024  5:18PM    < end of copied text >  
  Source of information: ETIENNE CHAUHAN, Chart review  Patient language: Slovak  : # [id number] Rony     HPI:  79-year-old female from home, ambulates with a walker at baseline, with HHA 10 hours/day x 7 days with PMH of HTN HLD depression presents to the ED complaining of left-sided rib cage pain s/p a fall out of bed 4 days ago.  Patient states the she fell out of bed while rolling out on Friday.  She has been having severe rib cage pain since then.  She complains of difficulty breathing due to pain.  She was able to get herself up.  As per HHA, patient has been having coughing, sputum production since Friday as well.  She denies any fever, headache, dizziness, chest pain, palpitation, nausea, vomiting, diarrhea, weakness, tingling, numbness.  In the ED, patient is comfortable, NAD, VSS, patient was found to have tenderness on palpation of the left posterior and lateral rib cage, saturating well on room air, patient was found to have WBC 14.8, CT head and cervical spine are unremarkable.  CT chest shows Displaced posterior eighth through 10th left left-sided rib fractures. No pneumothorax. Small left pleural effusion. Multifocal subsegmental consolidative changes with more prominent changes in the left lower lobe with associated areas of mucous plugging which although may be chronic in nature.  Patient received azithromycin, ceftriaxone, Tylenol, morphine in the ED.  Patient is admitted for pneumonia, acute rib fracture. (03 Apr 2024 18:59)      Pt is admitted for PNA and rib fractures.  CT chest- Displaced posterior eighth through 10th left left-sided rib fractures. No pneumothorax. Small left pleural effusion.  Multifocal subsegmental consolidative changes with more prominent changes in the left lower lobe with associated areas of mucous plugging which   although may be chronic in nature, recommend comparison to outside imaging and short-term follow-up CT in 4-6 weeks.  Pain consulted 4/3. Pt seen and examined at bedside. Pt sitting in chair, reports pain score 7/10  SCALE USED: (1-10 VNRS). Pt describes pain as aching, non- radiating, alleviated by pain medication, exacerbated by movement. Pt tolerating PO diet. Denies lethargy, chest pain, SOB, nausea, vomiting, constipation. Patient stated goal for pain control: to be able to take deep breaths, get out of bed to chair and ambulate with tolerable pain control. Reports ambulating to the bathroom. Pt denies taking medications for pain at home.     PAST MEDICAL & SURGICAL HISTORY:  HTN (hypertension)      HLD (hyperlipidemia)      Anxiety and depression      S/P bone marrow transplant          FAMILY HISTORY:  No pertinent family history in first degree relatives        Social History:  Lives at home, has home health aide 10 hours/day x 7 days, ambulates with a walker at baseline (03 Apr 2024 18:59)   [ C] Denies ETOH use, illicit drug use and smoking    Allergies    No Known Allergies    MEDICATIONS  (STANDING):  acetaminophen     Tablet .. 1000 milliGRAM(s) Oral every 8 hours  atorvastatin 40 milliGRAM(s) Oral at bedtime  azithromycin  IVPB 500 milliGRAM(s) IV Intermittent every 24 hours  cefTRIAXone   IVPB 1000 milliGRAM(s) IV Intermittent every 24 hours  enoxaparin Injectable 40 milliGRAM(s) SubCutaneous every 24 hours  lactated ringers. 1000 milliLiter(s) (75 mL/Hr) IV Continuous <Continuous>  lidocaine   4% Patch 1 Patch Transdermal daily  metoprolol tartrate 12.5 milliGRAM(s) Oral every 12 hours  naloxone Injectable 0.4 milliGRAM(s) IV Push once  polyethylene glycol 3350 17 Gram(s) Oral daily  senna 2 Tablet(s) Oral at bedtime    MEDICATIONS  (PRN):  bisacodyl 5 milliGRAM(s) Oral daily PRN Constipation  guaiFENesin ER 1200 milliGRAM(s) Oral every 12 hours PRN Cough  oxyCODONE    IR 5 milliGRAM(s) Oral every 4 hours PRN Severe Pain (7 - 10)  oxyCODONE    IR 2.5 milliGRAM(s) Oral every 4 hours PRN Moderate Pain (4 - 6)      Vital Signs Last 24 Hrs  T(C): 36.4 (04 Apr 2024 05:05), Max: 36.7 (03 Apr 2024 15:35)  T(F): 97.5 (04 Apr 2024 05:05), Max: 98 (03 Apr 2024 15:35)  HR: 70 (04 Apr 2024 09:32) (54 - 70)  BP: 138/68 (04 Apr 2024 09:32) (129/67 - 159/93)  BP(mean): 81 (04 Apr 2024 02:46) (81 - 81)  RR: 20 (04 Apr 2024 05:05) (18 - 20)  SpO2: 94% (04 Apr 2024 09:32) (92% - 97%)    Parameters below as of 04 Apr 2024 09:32  Patient On (Oxygen Delivery Method): nasal cannula        LABS: Reviewed.                          14.6   11.06 )-----------( 279      ( 04 Apr 2024 06:55 )             43.3     04-04    134<L>  |  101  |  15  ----------------------------<  110<H>  3.4<L>   |  29  |  0.75    Ca    8.7      04 Apr 2024 06:55  Phos  3.0     04-04  Mg     1.7     04-04    TPro  7.1  /  Alb  3.2<L>  /  TBili  0.8  /  DBili  x   /  AST  15  /  ALT  20  /  AlkPhos  92  04-04      LIVER FUNCTIONS - ( 04 Apr 2024 06:55 )  Alb: 3.2 g/dL / Pro: 7.1 g/dL / ALK PHOS: 92 U/L / ALT: 20 U/L DA / AST: 15 U/L / GGT: x           Urinalysis Basic - ( 04 Apr 2024 06:55 )    Color: x / Appearance: x / SG: x / pH: x  Gluc: 110 mg/dL / Ketone: x  / Bili: x / Urobili: x   Blood: x / Protein: x / Nitrite: x   Leuk Esterase: x / RBC: x / WBC x   Sq Epi: x / Non Sq Epi: x / Bacteria: x      CAPILLARY BLOOD GLUCOSE        SARS-CoV-2: NotDetec (03 Apr 2024 22:35)      Radiology: Reviewed.   < from: CT Head No Cont (04.03.24 @ 16:31) >    ACC: [id number] EXAM:  CT CERVICAL SPINE   ORDERED BY: HERMANN SOMMERS     ACC: [id number] EXAM:  CT BRAIN   ORDERED BY: HERMANN SOMMERS     PROCEDURE DATE:  04/03/2024          INTERPRETATION:  CT OF THE HEAD WITHOUT CONTRAST  CT CERVICAL SPINE WITHOUT CONTRAST    CLINICAL INDICATION: fall left rib pain.    TECHNIQUE: Volumetric CT acquisition was performed through the brain and   reviewed using brain and bone window technique. Dose optimization   techniques were utilized including kVp/mA modulation along with iterative   reconstructions.  Thin section CT images were obtained through cervical spine with   overlapping reconstructions.  Sagittal, coronal and bilateral oblique 2D   reformats were then generated from the initial images. 3-D   reconstructions of the cervical spine was performed on a separate   workstation and reviewed.    COMPARISON: No prior studies have been submitted for comparison.    FINDINGS:  CT head:  The ventricular and sulcal size and configuration is age appropriate.     There is no acute loss of gray-white differentiation. There are mild   patchy areas of hypodensity in the periventricular and subcortical white   matter which are likely related to chronic microangiopathic changes.    There is no evidence of mass effect, midline shift, acute intracranial   hemorrhage, or extra-axial collections.     The calvarium is intact. Partial opacification of the right sphenoid   sinus.The mastoid air cells are predominantly clear. There has been prior  bilateral cataract surgery.      CT cervical spine:      There is straightening of usual cervical lordosis. There is no   significant subluxation. Vertebral body height is preserved throughout   the cervical spine. There is no significant loss of intervertebral disc   height throughout the cervical spine.    No significant disc bulge or herniation. No definite high-grade central   canal stenosis. The paravertebral soft tissues are unremarkable.      IMPRESSION:  CT HEAD: There is no acute intracranial hemorrhage or depressed calvarial   fracture.    CT CERVICAL SPINE: No fracture or acute traumatic malalignment.    --- End of Report ---            JASON ZACARIAS MD; Attending Radiologist  This document has been electronically signed. Apr3 2024  4:51PM    < end of copied text >    < from: CT Chest No Cont (04.03.24 @ 16:31) >    ACC: [id number] EXAM:  CT CHEST   ORDERED BY: HERMANN SOMMERS     PROCEDURE DATE:  04/03/2024          INTERPRETATION:  CLINICAL INFORMATION: Left-sided rib pain    COMPARISON: None.    CONTRAST/COMPLICATIONS:  IV Contrast: NONE  Oral Contrast: NONE  Complications: None reported at time of study completion    PROCEDURE:  CT of the Chest was performed.  Sagittal and coronal reformats were performed.    FINDINGS:    LUNGS AND AIRWAYS: Patent central airways.  Subsegmental atelectasis and   or scarring in the medial right upper lobe, lingula and anterior right   lower lobe. Biapical scarring. Subsegmental consolidative changes in the   left lower lobe may represent atelectasis and/or scarring however   recommend comparison to outside imaging and short-term follow-up in 4-6   weeks.  PLEURA: Small left pleural effusion.  MEDIASTINUM AND ARSH: No lymphadenopathy.  VESSELS: Atherosclerotic changes of the aorta and coronary vasculature.  HEART: Heart size is normal. No pericardial effusion.  CHEST WALL AND LOWER NECK: Partially visualized left posterior back   intramuscular lipoma. Subcentimeter thyroid nodule  VISUALIZED UPPER ABDOMEN: Left adrenal adenoma. A few hepatic cysts.   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 Colonic diverticulosis.  BONES: Degenerative changes. Displaced posterior left eighth through 10th   rib fractures.    IMPRESSION:  Displaced posterior eighth through 10th left left-sided rib fractures. No   pneumothorax. Small left pleural effusion.    Multifocal subsegmental consolidative changes with more prominent changes   in the left lower lobe with associated areas of mucous plugging which   although may be chronic in nature, recommend comparison to outside   imaging and short-term follow-up CT in 4-6 weeks.    --- End of Report ---            NGA PAZ MD; Attending Radiologist  This document has been electronically signed. Apr  3 2024  5:18PM    < end of copied text >    ORT Score -   Family Hx of substance abuse	Female	      Male  Alcohol 	                                           1                     3  Illegal drugs	                                   2                     3  Rx drugs                                           4 	                  4  Personal Hx of substance abuse		  Alcohol 	                                          3	                  3  Illegal drugs                                     4	                  4  Rx drugs                                            5 	                  5  Age between 16- 45 years	           1                     1  hx preadolescent sexual abuse	   3 	                  0  Psychological disease		  ADD, OCD, bipolar, schizophrenia   2	          2  Depression                                           1 	          1  Total: 0    a score of 3 or lower indicates low risk for opioid abuse		  a score of 4-7 indicates moderate risk for opioid abuse		  a score of 8 or higher indicates high risk for opioid abuse    REVIEW OF SYSTEMS:  CONSTITUTIONAL: No fever or fatigue  HEENT:  No difficulty hearing, no change in vision  NECK: No pain or stiffness  RESPIRATORY: No cough, wheezing, chills or hemoptysis; No shortness of breath  CARDIOVASCULAR: No chest pain, palpitations, dizziness, or leg swelling  GASTROINTESTINAL: No loss of appetite, decreased PO intake. No abdominal or epigastric pain. No nausea, vomiting; No diarrhea or constipation.   GENITOURINARY: No dysuria, frequency, hematuria, retention or incontinence  MUSCULOSKELETAL: + left rib cage pain. No joint swelling; No back pain, no upper or lower motor strength weakness, no saddle anesthesia, bowel/bladder incontinence, + falls   NEURO: No headaches, No numbness/tingling b/l LE, No weakness  PSYCHIATRIC: + hx dementia, anxiety/ depression     PHYSICAL EXAM:  GENERAL:  Alert & Oriented X3, cooperative, NAD, Good concentration. Speech is clear.   RESPIRATORY: Respirations even and unlabored. Clear to auscultation bilaterally; No rales, rhonchi, wheezing, or rubs  CARDIOVASCULAR: Normal S1/S2, regular rate and rhythm; No murmurs, rubs, or gallops. No JVD.   GASTROINTESTINAL:  Soft, Nontender, Nondistended; Bowel sounds present  PERIPHERAL VASCULAR:  Extremities warm without edema. 2+ Peripheral Pulses, No cyanosis, No calf tenderness  MUSCULOSKELETAL: Motor Strength 5/5 B/L upper and lower extremities; moves all extremities equally against gravity; ROM intact; + left lower mid- posterior rib cage tenderness on palpation   SKIN: Warm, dry, intact. No rashes, lesions, scars or wounds.     Risk factors associated with adverse outcomes related to opioid treatment  [ ]  Concurrent benzodiazepine use  [ ]  History/ Active substance use or alcohol use disorder  [ ] Psychiatric co-morbidity  [ ] Sleep apnea  [ ] COPD  [ ] BMI> 35  [ ] Liver dysfunction  [ ] Renal dysfunction  [ ] CHF  [ ] Smoker  [X ]  Age > 60 years    [X ]  [place]  Reviewed and Copied to Chart. See below.    Plan of care and goal oriented pain management treatment options were discussed with patient and /or primary care giver; all questions and concerns were addressed and care was aligned with patient's wishes.    Educated patient on goal oriented pain management treatment options

## 2024-04-05 ENCOUNTER — TRANSCRIPTION ENCOUNTER (OUTPATIENT)
Age: 79
End: 2024-04-05

## 2024-04-05 LAB
ANION GAP SERPL CALC-SCNC: 4 MMOL/L — LOW (ref 5–17)
BUN SERPL-MCNC: 12 MG/DL — SIGNIFICANT CHANGE UP (ref 7–18)
CALCIUM SERPL-MCNC: 8.8 MG/DL — SIGNIFICANT CHANGE UP (ref 8.4–10.5)
CHLORIDE SERPL-SCNC: 108 MMOL/L — SIGNIFICANT CHANGE UP (ref 96–108)
CO2 SERPL-SCNC: 26 MMOL/L — SIGNIFICANT CHANGE UP (ref 22–31)
CREAT SERPL-MCNC: 0.68 MG/DL — SIGNIFICANT CHANGE UP (ref 0.5–1.3)
EGFR: 87 ML/MIN/1.73M2 — SIGNIFICANT CHANGE UP
GLUCOSE SERPL-MCNC: 118 MG/DL — HIGH (ref 70–99)
HCT VFR BLD CALC: 40.4 % — SIGNIFICANT CHANGE UP (ref 34.5–45)
HGB BLD-MCNC: 13.4 G/DL — SIGNIFICANT CHANGE UP (ref 11.5–15.5)
LEGIONELLA AG UR QL: NEGATIVE — SIGNIFICANT CHANGE UP
MCHC RBC-ENTMCNC: 32.1 PG — SIGNIFICANT CHANGE UP (ref 27–34)
MCHC RBC-ENTMCNC: 33.2 GM/DL — SIGNIFICANT CHANGE UP (ref 32–36)
MCV RBC AUTO: 96.7 FL — SIGNIFICANT CHANGE UP (ref 80–100)
NRBC # BLD: 0 /100 WBCS — SIGNIFICANT CHANGE UP (ref 0–0)
PLATELET # BLD AUTO: 263 K/UL — SIGNIFICANT CHANGE UP (ref 150–400)
POTASSIUM SERPL-MCNC: 3.7 MMOL/L — SIGNIFICANT CHANGE UP (ref 3.5–5.3)
POTASSIUM SERPL-SCNC: 3.7 MMOL/L — SIGNIFICANT CHANGE UP (ref 3.5–5.3)
RBC # BLD: 4.18 M/UL — SIGNIFICANT CHANGE UP (ref 3.8–5.2)
RBC # FLD: 15.2 % — HIGH (ref 10.3–14.5)
S PNEUM AG UR QL: NEGATIVE — SIGNIFICANT CHANGE UP
SODIUM SERPL-SCNC: 138 MMOL/L — SIGNIFICANT CHANGE UP (ref 135–145)
WBC # BLD: 8.07 K/UL — SIGNIFICANT CHANGE UP (ref 3.8–10.5)
WBC # FLD AUTO: 8.07 K/UL — SIGNIFICANT CHANGE UP (ref 3.8–10.5)

## 2024-04-05 PROCEDURE — 99232 SBSQ HOSP IP/OBS MODERATE 35: CPT | Mod: FS

## 2024-04-05 PROCEDURE — 99232 SBSQ HOSP IP/OBS MODERATE 35: CPT

## 2024-04-05 RX ORDER — PANTOPRAZOLE SODIUM 20 MG/1
40 TABLET, DELAYED RELEASE ORAL
Refills: 0 | Status: DISCONTINUED | OUTPATIENT
Start: 2024-04-05 | End: 2024-04-06

## 2024-04-05 RX ORDER — OXYCODONE HYDROCHLORIDE 5 MG/1
5 TABLET ORAL EVERY 6 HOURS
Refills: 0 | Status: DISCONTINUED | OUTPATIENT
Start: 2024-04-05 | End: 2024-04-06

## 2024-04-05 RX ORDER — CEFUROXIME AXETIL 250 MG
1 TABLET ORAL
Qty: 4 | Refills: 0
Start: 2024-04-05 | End: 2024-04-06

## 2024-04-05 RX ADMIN — Medication 1000 MILLIGRAM(S): at 14:26

## 2024-04-05 RX ADMIN — OXYCODONE HYDROCHLORIDE 5 MILLIGRAM(S): 5 TABLET ORAL at 12:12

## 2024-04-05 RX ADMIN — LIDOCAINE 1 PATCH: 4 CREAM TOPICAL at 12:12

## 2024-04-05 RX ADMIN — Medication 1000 MILLIGRAM(S): at 05:49

## 2024-04-05 RX ADMIN — OXYCODONE HYDROCHLORIDE 5 MILLIGRAM(S): 5 TABLET ORAL at 11:23

## 2024-04-05 RX ADMIN — Medication 12.5 MILLIGRAM(S): at 05:50

## 2024-04-05 RX ADMIN — ENOXAPARIN SODIUM 40 MILLIGRAM(S): 100 INJECTION SUBCUTANEOUS at 05:49

## 2024-04-05 RX ADMIN — Medication 1000 MILLIGRAM(S): at 21:56

## 2024-04-05 RX ADMIN — CEFTRIAXONE 100 MILLIGRAM(S): 500 INJECTION, POWDER, FOR SOLUTION INTRAMUSCULAR; INTRAVENOUS at 17:58

## 2024-04-05 RX ADMIN — Medication 1000 MILLIGRAM(S): at 15:04

## 2024-04-05 RX ADMIN — Medication 250 MILLIGRAM(S): at 17:57

## 2024-04-05 RX ADMIN — OXYCODONE HYDROCHLORIDE 5 MILLIGRAM(S): 5 TABLET ORAL at 13:18

## 2024-04-05 RX ADMIN — SENNA PLUS 2 TABLET(S): 8.6 TABLET ORAL at 21:56

## 2024-04-05 RX ADMIN — AZITHROMYCIN 255 MILLIGRAM(S): 500 TABLET, FILM COATED ORAL at 18:00

## 2024-04-05 RX ADMIN — Medication 1000 MILLIGRAM(S): at 22:00

## 2024-04-05 RX ADMIN — Medication 1000 MILLIGRAM(S): at 06:15

## 2024-04-05 RX ADMIN — POLYETHYLENE GLYCOL 3350 17 GRAM(S): 17 POWDER, FOR SOLUTION ORAL at 12:12

## 2024-04-05 RX ADMIN — OXYCODONE HYDROCHLORIDE 5 MILLIGRAM(S): 5 TABLET ORAL at 09:10

## 2024-04-05 RX ADMIN — ATORVASTATIN CALCIUM 40 MILLIGRAM(S): 80 TABLET, FILM COATED ORAL at 21:56

## 2024-04-05 RX ADMIN — Medication 12.5 MILLIGRAM(S): at 17:57

## 2024-04-05 RX ADMIN — Medication 250 MILLIGRAM(S): at 19:23

## 2024-04-05 RX ADMIN — LIDOCAINE 1 PATCH: 4 CREAM TOPICAL at 19:29

## 2024-04-05 RX ADMIN — LIDOCAINE 1 PATCH: 4 CREAM TOPICAL at 00:00

## 2024-04-05 NOTE — PROGRESS NOTE ADULT - PROBLEM SELECTOR PLAN 3
History of hypertension  As per HHA patient takes metoprolol 25 mg daily  Start Metoprolol 12.5mg BID  Keep goal <130/90
History of hypertension  As per HHA patient takes metoprolol 25 mg daily  Start Metoprolol 12.5mg BID  Keep goal <130/90

## 2024-04-05 NOTE — PROGRESS NOTE ADULT - NS ATTEND AMEND GEN_ALL_CORE FT
79-year-old Sami speaking  female from home, ambulates with a walker at baseline, with HHA 10 hours/day x 7 days with PMH of HTN, HLD, depression and Alzheimer's dementia   presents to the ED complaining of left-sided rib cage pain s/p a fall out of bed 4 days ago. Patient informs that she is able to walk and carry out other activities but continues to have left sided chest wall pain. She also c/o having cough productive of whitish sputum and shortness of breath, denies any fever, chills, nausea, vomiting, abdominal pain or other complaints. Admitted for Pneumonia and rib fx     Pt feels better. No new complaints       Labs reviewed- cbc, bmp    PE as above     A/P:  #Multi-focal pneumonia  #Rib fx- 8th-10th   #HTN  #HLD  #Dementia   #DVT ppx      Plan:  -C/w IV Rocephin x5-7  and Zithromax x3 days    -Mycoplasma, legionella, RVP negative   -Mucinex for mucolytic effect   -Pain control for rib fx, incentive spirometer. Appreciate thoracic surgery consult   -c/w statin   -Medically stable for DC, spoke w/ Mr Chelsi Conway informed that patient only has HHA in am and will alone at night. He would prefer to be with her when she goes home. Patient to get DC tmrw morning, CM made aware- needs transport.

## 2024-04-05 NOTE — DIETITIAN INITIAL EVALUATION ADULT - ORAL INTAKE PTA/DIET HISTORY
spoke with nephew at bedside , translated for patient in Syriac , (PT agrees) . reports consuming meals PTA.  no

## 2024-04-05 NOTE — PROGRESS NOTE ADULT - PROBLEM SELECTOR PLAN 2
Presents s/p fall out of bed  Complains of severe left-sided posterior and lateral rib cage pain  CT chest shows Displaced posterior eighth through 10th left left-sided rib fractures. No pneumothorax. Small left pleural effusion  Continue with pain management with Tylenol, lidocaine patch, oxycodone 2.5 mg for moderate and 5 mg for severe pain  PT recs Home PT  Fall risk precautions  Pain mgt following  Thoracic surgery: Recs no thoracic surgical intervention
Presents s/p fall out of bed  Complains of severe left-sided posterior and lateral rib cage pain  CT chest shows Displaced posterior eighth through 10th left left-sided rib fractures. No pneumothorax. Small left pleural effusion  Continue with pain management with Tylenol, lidocaine patch, oxycodone 2.5 mg for moderate and 5 mg for severe pain  PT recs Home PT  Fall risk precautions  Pain mgt following  Thoracic surgery: Recs no thoracic surgical intervention

## 2024-04-05 NOTE — DISCHARGE NOTE PROVIDER - ATTENDING DISCHARGE PHYSICAL EXAMINATION:
Vital Signs Last 24 Hrs  T(C): 37.1 (06 Apr 2024 14:00), Max: 37.1 (06 Apr 2024 14:00)  T(F): 98.7 (06 Apr 2024 14:00), Max: 98.7 (06 Apr 2024 14:00)  HR: 60 (06 Apr 2024 14:00) (54 - 60)  BP: 142/66 (06 Apr 2024 14:00) (113/57 - 159/77)  BP(mean): --  RR: 18 (06 Apr 2024 14:00) (18 - 18)  SpO2: 96% (06 Apr 2024 14:00) (95% - 97%)    Parameters below as of 06 Apr 2024 14:00  Patient On (Oxygen Delivery Method): room air    PHYSICAL EXAM:  GENERAL: NAD  HEENT: Normocephalic;  conjunctivae and sclerae clear; moist mucous membranes;   NECK : supple  CHEST/LUNG: Clear to auscultation bilaterally with good air entry   HEART: S1 S2  regular; no murmurs, gallops or rubs  ABDOMEN: Soft, Nontender, Nondistended; Bowel sounds present  EXTREMITIES: no cyanosis; no edema; no calf tenderness  SKIN: warm and dry; no rash  NERVOUS SYSTEM:  A&Ox2

## 2024-04-05 NOTE — DIETITIAN INITIAL EVALUATION ADULT - PERTINENT LABORATORY DATA
04-05    138  |  108  |  12  ----------------------------<  118<H>  3.7   |  26  |  0.68    Ca    8.8      05 Apr 2024 07:00  Phos  3.0     04-04  Mg     1.7     04-04    TPro  7.1  /  Alb  3.2<L>  /  TBili  0.8  /  DBili  x   /  AST  15  /  ALT  20  /  AlkPhos  92  04-04

## 2024-04-05 NOTE — PROGRESS NOTE ADULT - PROBLEM SELECTOR PLAN 5
History of anxiety and depression  HHA unaware of medications at this time
History of anxiety and depression  HHA unaware of medications at this time

## 2024-04-05 NOTE — DISCHARGE NOTE PROVIDER - PROVIDER TOKENS
FREE:[LAST:[Hal],FIRST:[Man],PHONE:[(   )    -],FAX:[(   )    -],ADDRESS:[[facility]],ESTABLISHEDPATIENT:[T]]

## 2024-04-05 NOTE — DISCHARGE NOTE PROVIDER - NSDCMRMEDTOKEN_GEN_ALL_CORE_FT
atorvastatin 40 mg oral tablet: 1 tab(s) orally once a day (at bedtime)  metoprolol succinate 25 mg oral capsule, extended release: 1 cap(s) orally once a day   atorvastatin 40 mg oral tablet: 1 tab(s) orally once a day (at bedtime)  cefuroxime 250 mg oral tablet: 1 tab(s) orally 2 times a day  metoprolol succinate 25 mg oral capsule, extended release: 1 cap(s) orally once a day   acetaminophen 325 mg oral capsule: 1 cap(s) orally every 8 hours as needed for  mild pain  atorvastatin 20 mg oral tablet: 1 tab(s) orally once a day  cefuroxime 250 mg oral tablet: 1 tab(s) orally 2 times a day  donepezil 10 mg oral tablet: 1 tab(s) orally once a day  FLUoxetine 10 mg oral capsule: 1 cap(s) orally once a day  guaiFENesin 100 mg/5 mL oral liquid: 10 milliliter(s) orally 2 times a day  hydroCHLOROthiazide 12.5 mg oral capsule: 1 cap(s) orally once a day  lidocaine 4% topical film: Apply topically to affected area once a day  metoprolol tartrate 25 mg oral tablet: 1 tab(s) orally 2 times a day  omeprazole 20 mg oral delayed release capsule: 1 cap(s) orally once a day

## 2024-04-05 NOTE — PROGRESS NOTE ADULT - PROBLEM SELECTOR PLAN 8
pt is from home  PT recs Home PT
pt is from home  PT recs Home PT  Pt is optimized for d/c today. Family prefers her to be d/anastasiya tomorrow 4/6  CM following

## 2024-04-05 NOTE — DIETITIAN INITIAL EVALUATION ADULT - PROBLEM SELECTOR PLAN 2
Presents s/p fall out of bed  Complains of severe left-sided posterior and lateral rib cage pain  CT chest shows Displaced posterior eighth through 10th left left-sided rib fractures. No pneumothorax. Small left pleural effusion  Continue with pain management with Tylenol, lidocaine patch, oxycodone 2.5 mg for moderate and 5 mg for severe pain  PT consult  Fall risk precautions

## 2024-04-05 NOTE — DIETITIAN INITIAL EVALUATION ADULT - OTHER INFO
reports No weight loss. Height per patient: 5'5". Has No food allergies. given No DM history , suggest To change To DASH/TLC .

## 2024-04-05 NOTE — DIETITIAN INITIAL EVALUATION ADULT - PERTINENT MEDS FT
MEDICATIONS  (STANDING):  acetaminophen     Tablet .. 1000 milliGRAM(s) Oral every 8 hours  atorvastatin 40 milliGRAM(s) Oral at bedtime  azithromycin  IVPB 500 milliGRAM(s) IV Intermittent every 24 hours  cefTRIAXone   IVPB 1000 milliGRAM(s) IV Intermittent every 24 hours  enoxaparin Injectable 40 milliGRAM(s) SubCutaneous every 24 hours  lactated ringers. 1000 milliLiter(s) (75 mL/Hr) IV Continuous <Continuous>  lidocaine   4% Patch 1 Patch Transdermal daily  metoprolol tartrate 12.5 milliGRAM(s) Oral every 12 hours  naloxone Injectable 0.4 milliGRAM(s) IV Push once  naproxen 250 milliGRAM(s) Oral two times a day  pantoprazole    Tablet 40 milliGRAM(s) Oral before breakfast  polyethylene glycol 3350 17 Gram(s) Oral daily  senna 2 Tablet(s) Oral at bedtime    MEDICATIONS  (PRN):  bisacodyl 5 milliGRAM(s) Oral daily PRN Constipation  guaiFENesin ER 1200 milliGRAM(s) Oral every 12 hours PRN Cough  oxyCODONE    IR 5 milliGRAM(s) Oral every 6 hours PRN Severe Pain (7 - 10)

## 2024-04-05 NOTE — PROGRESS NOTE ADULT - PROBLEM SELECTOR PLAN 4
History of hyperlipidemia  Patient takes atorvastatin 40 mg daily  Continue with home meds   Diet DASH
History of hyperlipidemia  Patient takes atorvastatin 40 mg daily  Continue with home meds   Diet DASH

## 2024-04-05 NOTE — DISCHARGE NOTE PROVIDER - NSDCCPCAREPLAN_GEN_ALL_CORE_FT
PRINCIPAL DISCHARGE DIAGNOSIS  Diagnosis: Pneumonia  Assessment and Plan of Treatment: - You presented with left sided rib pain after a fall few days ago.  - Your CT of chest shows multifocal pneumonia.  - You were treated with IV antibiotics.   - Pneumonia is a lung infection that can cause a fever, cough, and trouble breathing.  Continue all antibiotics as ordered until complete.  Nutrition is important, eat small frequent meals.  Get lots of rest and drink fluids.  Call your health care provider upon arrival home from hospital and make a follow up appointment for one week.  If your cough worsens, you develop fever greater than 101', you have shaking chills, a fast heartbeat, trouble breathing and/or feel your are breathing much faster than usual, call your healthcare provider.  Make sure you wash your hands frequently.        SECONDARY DISCHARGE DIAGNOSES  Diagnosis: Rib fracture  Assessment and Plan of Treatment: - You presented with left rib pain, & cat scan of chest  shows displaced posterior eight thru 10th left sided rib fractures.   - You were evaluated by thoracic surgery, they recommended no surgical intervention  - You were seen by pain management team  - Physical therapy evaluation    Diagnosis: HTN (hypertension)  Assessment and Plan of Treatment:     Diagnosis: Anxiety and depression  Assessment and Plan of Treatment:     Diagnosis: Pneumonia  Assessment and Plan of Treatment:      PRINCIPAL DISCHARGE DIAGNOSIS  Diagnosis: Pneumonia  Assessment and Plan of Treatment: - You presented with left sided rib pain after a fall few days ago.  - Your CT of chest shows multifocal pneumonia.  - You were treated with IV antibiotics.   - Pneumonia is a lung infection that can cause a fever, cough, and trouble breathing.  Continue all antibiotics as ordered until complete.  Nutrition is important, eat small frequent meals.  Get lots of rest and drink fluids.  Call your health care provider upon arrival home from hospital and make a follow up appointment for one week.  If your cough worsens, you develop fever greater than 101', you have shaking chills, a fast heartbeat, trouble breathing and/or feel your are breathing much faster than usual, call your healthcare provider.  Make sure you wash your hands frequently.        SECONDARY DISCHARGE DIAGNOSES  Diagnosis: Rib fracture  Assessment and Plan of Treatment: - You presented with left rib pain, & cat scan of chest  shows displaced posterior eight thru 10th left sided rib fractures.   - You were evaluated by thoracic surgery, they recommended no surgical intervention  - You were seen by pain management team  - Physical therapy evaluation recommends Home PT    Diagnosis: HTN (hypertension)  Assessment and Plan of Treatment: - Continue to take your blood pressure medications Metoprolol as prescribed.  - Follow a Low salt diet  - Activity as tolerated.  - Take all medication as prescribed.  Follow up with your medical doctor for routine blood pressure monitoring at your next visit.  Notify your doctor if you have any of the following symptoms:   Dizziness, Lightheadedness, Blurry vision, Headache, Chest pain, Shortness of breath      Diagnosis: Anxiety and depression  Assessment and Plan of Treatment: Take your anxiety & depression medication as prescribed    Diagnosis: Dementia  Assessment and Plan of Treatment: - You have a history of dementia.  - Follow up with your PCP.    Diagnosis: HLD (hyperlipidemia)  Assessment and Plan of Treatment: - Continue to take your cholesterole medication as prescribed     PRINCIPAL DISCHARGE DIAGNOSIS  Diagnosis: Pneumonia  Assessment and Plan of Treatment: - You presented with left sided rib pain after a fall few days ago.  - Your CT of chest shows multifocal pneumonia.  - You were treated with IV antibiotics, please complete antibiotic course as prescribed.   - Pneumonia is a lung infection that can cause a fever, cough, and trouble breathing.  Continue all antibiotics as ordered until complete.  Nutrition is important, eat small frequent meals.  Get lots of rest and drink fluids.  Call your health care provider upon arrival home from hospital and make a follow up appointment for one week.  If your cough worsens, you develop fever greater than 101', you have shaking chills, a fast heartbeat, trouble breathing and/or feel your are breathing much faster than usual, call your healthcare provider.  Make sure you wash your hands frequently.        SECONDARY DISCHARGE DIAGNOSES  Diagnosis: Rib fracture  Assessment and Plan of Treatment: - You presented with left rib pain, & cat scan of chest  shows displaced posterior eight thru 10th left sided rib fractures.   - You were evaluated by thoracic surgery, they recommended no surgical intervention  - You were seen by pain management team  - Physical therapy evaluation recommends Home PT  -Please continue using incentive spirometer.    Diagnosis: HTN (hypertension)  Assessment and Plan of Treatment: - Continue to take your blood pressure medications Metoprolol as prescribed.  - Follow a Low salt diet  - Activity as tolerated.  - Take all medication as prescribed.  Follow up with your medical doctor for routine blood pressure monitoring at your next visit.  Notify your doctor if you have any of the following symptoms:   Dizziness, Lightheadedness, Blurry vision, Headache, Chest pain, Shortness of breath      Diagnosis: Anxiety and depression  Assessment and Plan of Treatment: Take your anxiety & depression medication as prescribed    Diagnosis: HLD (hyperlipidemia)  Assessment and Plan of Treatment: - Continue to take your cholesterole medication as prescribed    Diagnosis: Dementia  Assessment and Plan of Treatment: - You have a history of dementia.  - Follow up with your PCP.

## 2024-04-05 NOTE — PROGRESS NOTE ADULT - PROBLEM SELECTOR PLAN 6
History of dementia  HHA unaware of medications at this time
History of dementia  HHA unaware of medications at this time

## 2024-04-06 ENCOUNTER — TRANSCRIPTION ENCOUNTER (OUTPATIENT)
Age: 79
End: 2024-04-06

## 2024-04-06 VITALS
OXYGEN SATURATION: 96 % | SYSTOLIC BLOOD PRESSURE: 142 MMHG | RESPIRATION RATE: 18 BRPM | TEMPERATURE: 99 F | DIASTOLIC BLOOD PRESSURE: 66 MMHG | HEART RATE: 60 BPM

## 2024-04-06 PROCEDURE — 85025 COMPLETE CBC W/AUTO DIFF WBC: CPT

## 2024-04-06 PROCEDURE — 99239 HOSP IP/OBS DSCHRG MGMT >30: CPT

## 2024-04-06 PROCEDURE — 80048 BASIC METABOLIC PNL TOTAL CA: CPT

## 2024-04-06 PROCEDURE — 36415 COLL VENOUS BLD VENIPUNCTURE: CPT

## 2024-04-06 PROCEDURE — 85027 COMPLETE CBC AUTOMATED: CPT

## 2024-04-06 PROCEDURE — 84484 ASSAY OF TROPONIN QUANT: CPT

## 2024-04-06 PROCEDURE — 82550 ASSAY OF CK (CPK): CPT

## 2024-04-06 PROCEDURE — 83735 ASSAY OF MAGNESIUM: CPT

## 2024-04-06 PROCEDURE — 84100 ASSAY OF PHOSPHORUS: CPT

## 2024-04-06 PROCEDURE — 99285 EMERGENCY DEPT VISIT HI MDM: CPT

## 2024-04-06 PROCEDURE — 86738 MYCOPLASMA ANTIBODY: CPT

## 2024-04-06 PROCEDURE — 80053 COMPREHEN METABOLIC PANEL: CPT

## 2024-04-06 PROCEDURE — 0225U NFCT DS DNA&RNA 21 SARSCOV2: CPT

## 2024-04-06 PROCEDURE — 71250 CT THORAX DX C-: CPT | Mod: MC

## 2024-04-06 PROCEDURE — 70450 CT HEAD/BRAIN W/O DYE: CPT | Mod: MC

## 2024-04-06 PROCEDURE — 87640 STAPH A DNA AMP PROBE: CPT

## 2024-04-06 PROCEDURE — 87641 MR-STAPH DNA AMP PROBE: CPT

## 2024-04-06 PROCEDURE — 96374 THER/PROPH/DIAG INJ IV PUSH: CPT

## 2024-04-06 PROCEDURE — 72125 CT NECK SPINE W/O DYE: CPT | Mod: MC

## 2024-04-06 PROCEDURE — 87070 CULTURE OTHR SPECIMN AEROBIC: CPT

## 2024-04-06 PROCEDURE — 87449 NOS EACH ORGANISM AG IA: CPT

## 2024-04-06 PROCEDURE — 87899 AGENT NOS ASSAY W/OPTIC: CPT

## 2024-04-06 PROCEDURE — T1013: CPT

## 2024-04-06 RX ORDER — ACETAMINOPHEN 500 MG
2 TABLET ORAL
Qty: 30 | Refills: 0
Start: 2024-04-06 | End: 2024-04-10

## 2024-04-06 RX ORDER — LIDOCAINE 4 G/100G
1 CREAM TOPICAL
Qty: 5 | Refills: 0
Start: 2024-04-06 | End: 2024-04-10

## 2024-04-06 RX ORDER — METOPROLOL TARTRATE 50 MG
1 TABLET ORAL
Refills: 0 | DISCHARGE

## 2024-04-06 RX ORDER — HYDROCHLOROTHIAZIDE 25 MG
1 TABLET ORAL
Refills: 0 | DISCHARGE

## 2024-04-06 RX ORDER — DONEPEZIL HYDROCHLORIDE 10 MG/1
1 TABLET, FILM COATED ORAL
Refills: 0 | DISCHARGE

## 2024-04-06 RX ORDER — ATORVASTATIN CALCIUM 80 MG/1
1 TABLET, FILM COATED ORAL
Refills: 0 | DISCHARGE

## 2024-04-06 RX ORDER — CEFUROXIME AXETIL 250 MG
1 TABLET ORAL
Qty: 4 | Refills: 0
Start: 2024-04-06 | End: 2024-04-07

## 2024-04-06 RX ORDER — FLUOXETINE HCL 10 MG
1 CAPSULE ORAL
Refills: 0 | DISCHARGE

## 2024-04-06 RX ORDER — OMEPRAZOLE 10 MG/1
1 CAPSULE, DELAYED RELEASE ORAL
Refills: 0 | DISCHARGE

## 2024-04-06 RX ORDER — OLANZAPINE 15 MG/1
5 TABLET, FILM COATED ORAL ONCE
Refills: 0 | Status: COMPLETED | OUTPATIENT
Start: 2024-04-06 | End: 2024-04-06

## 2024-04-06 RX ORDER — ACETAMINOPHEN 500 MG
1 TABLET ORAL
Qty: 15 | Refills: 0
Start: 2024-04-06 | End: 2024-04-10

## 2024-04-06 RX ADMIN — POLYETHYLENE GLYCOL 3350 17 GRAM(S): 17 POWDER, FOR SOLUTION ORAL at 11:54

## 2024-04-06 RX ADMIN — LIDOCAINE 1 PATCH: 4 CREAM TOPICAL at 00:39

## 2024-04-06 RX ADMIN — OLANZAPINE 5 MILLIGRAM(S): 15 TABLET, FILM COATED ORAL at 00:43

## 2024-04-06 RX ADMIN — Medication 250 MILLIGRAM(S): at 06:15

## 2024-04-06 RX ADMIN — PANTOPRAZOLE SODIUM 40 MILLIGRAM(S): 20 TABLET, DELAYED RELEASE ORAL at 06:15

## 2024-04-06 RX ADMIN — LIDOCAINE 1 PATCH: 4 CREAM TOPICAL at 11:53

## 2024-04-06 RX ADMIN — Medication 1000 MILLIGRAM(S): at 09:16

## 2024-04-06 RX ADMIN — Medication 12.5 MILLIGRAM(S): at 06:15

## 2024-04-06 RX ADMIN — Medication 250 MILLIGRAM(S): at 09:16

## 2024-04-06 RX ADMIN — Medication 1000 MILLIGRAM(S): at 06:15

## 2024-04-06 RX ADMIN — ENOXAPARIN SODIUM 40 MILLIGRAM(S): 100 INJECTION SUBCUTANEOUS at 06:15

## 2024-04-06 NOTE — DISCHARGE NOTE NURSING/CASE MANAGEMENT/SOCIAL WORK - PATIENT PORTAL LINK FT
You can access the FollowMyHealth Patient Portal offered by [facility] by registering at the following website: [email or web address]. By joining [facility]’s FollowMyHealth portal, you will also be able to view your health information using other applications (apps) compatible with our system.

## 2024-04-06 NOTE — CHART NOTE - NSCHARTNOTEFT_GEN_A_CORE
Patient follows w/ Dr. Man Hong at [facility]. Office is currently closed, will attempt on Monday

## 2024-04-06 NOTE — PROGRESS NOTE ADULT - ASSESSMENT
Confidential Drug Utilization Report  Search Terms: Chelsea Pollock, 1945 Search Date: 04/04/2024 11:55:39 AM  The Drug Utilization Report below displays all of the controlled substance prescriptions, if any, that your patient has filled in the last twelve months. The information displayed on this report is compiled from pharmacy submissions to the Department, and accurately reflects the information as submitted by the pharmacies.    This report was requested by: Starr Dwyer | Reference #: [id number]    There are no results for the search terms that you entered.
79-year-old female from home, ambulates with a walker at baseline, with HHA 10 hours/day x 7 days with PMH of HTN HLD depression, dementia presents to the ED complaining of left-sided rib cage pain s/p a fall out of bed 4 days ago.  In the ED, patient is comfortable, NAD, VSS, patient was found to have tenderness on palpation of the left posterior and lateral rib cage, saturating well on room air, patient was found to have WBC 14.8, CT head and cervical spine are unremarkable.  CT chest shows Displaced posterior eighth through 10th left left-sided rib fractures. No pneumothorax. Small left pleural effusion. Multifocal subsegmental consolidative changes with more prominent changes in the left lower lobe with associated areas of mucous plugging which although may be chronic in nature.  Patient received azithromycin, ceftriaxone, Tylenol, morphine in the ED.  Patient is admitted for pneumonia, acute rib fracture. Thoracic surgery & pain mgt consulted.   Patient is optimized for discharge, family prefers her to be discharged tomorrow 4/6.      
Confidential Drug Utilization Report  Search Terms: Chelsea Pollock, 1945Search Date: 04/04/2024 11:55:39 AM  The Drug Utilization Report below displays all of the controlled substance prescriptions, if any, that your patient has filled in the last twelve months. The information displayed on this report is compiled from pharmacy submissions to the Department, and accurately reflects the information as submitted by the pharmacies.    This report was requested by: Starr Dwyer | Reference #: [id number]    There are no results for the search terms that you entered.
79-year-old female from home, ambulates with a walker at baseline, with HHA 10 hours/day x 7 days with PMH of HTN HLD depression, dementia presents to the ED complaining of left-sided rib cage pain s/p a fall out of bed 4 days ago.  In the ED, patient is comfortable, NAD, VSS, patient was found to have tenderness on palpation of the left posterior and lateral rib cage, saturating well on room air, patient was found to have WBC 14.8, CT head and cervical spine are unremarkable.  CT chest shows Displaced posterior eighth through 10th left left-sided rib fractures. No pneumothorax. Small left pleural effusion. Multifocal subsegmental consolidative changes with more prominent changes in the left lower lobe with associated areas of mucous plugging which although may be chronic in nature.  Patient received azithromycin, ceftriaxone, Tylenol, morphine in the ED.  Patient is admitted for pneumonia, acute rib fracture. Thoracic surgery & pain mgt consulted.

## 2024-04-06 NOTE — PROGRESS NOTE ADULT - PROBLEM SELECTOR PLAN 1
p/w cough, Sputum production, WBC 14  CT Chest shows Multifocal subsegmental consolidative changes with more prominent changes in the left lower lobe with associated areas of mucous plugging which although may be chronic in nature.   s/p Ceftriaxone, azithromycin, Tylenol, morphine in the ED  Start azithromycin 500mg qd + rocephin 1g qd  F/U strep ag, legionella, RVP, procalcitonin, mycoplasma igm  Oxygen supplementation as needed  Obtain ambulatory O2 sat
Pt with acute left sided rib pain which is somatic in nature due to left posterior 8-10 rib fractures.   High risk medications reviewed. Avoid polypharmacy. Avoid IV opioids. Avoid benzodiazepines. Non-pharmacological sleep aides initiated. Non-opioid medications and non-pharmacological pain management measures initiated.    Opioid pain recommendations   - Change Oxycodone 5 mg PO q 6 hours PRN severe pain. Monitor for sedation/ respiratory depression.   Non-opioid pain recommendations   - Continue Acetaminophen 1 gram PO q 8 hours x 4 days, then PRN moderate pain. Monitor LFTs  - Continue Lidoderm 4% patch daily.   - Naproxen 250mg PO BID x 3 days with PPI.   Bowel Regimen  - Continue Miralax 17G PO daily  - Continue Senna 2 tablets at bedtime for constipation  - Continue dulcolax 5mg PO daily PRN constipation  Mild pain (score 1-3)  - Non-pharmacological pain treatment recommendations  - Warm/ Cool packs PRN   - Repositioning, imagery, relaxation, distraction.  - Physical therapy OOB if no contraindications   Recommendations discussed with primary team and RN.
Pt with left sided rib pain which is somatic in nature due to left posterior 8-10 rib fractures.   High risk medications reviewed. Avoid polypharmacy. Avoid IV opioids. Avoid NSAIDs and benzodiazepines. Non-pharmacological sleep aides initiated. Non-opioid medications and non-pharmacological pain management measures initiated.    Opioid pain recommendations   - Continue Oxycodone 5 mg PO q 4 hours PRN severe pain. Monitor for sedation/ respiratory depression.   Non-opioid pain recommendations   - Continue Acetaminophen 1 gram PO q 8 hours x 4 days, then PRN moderate pain. Monitor LFTs  - Continue Lidoderm 4% patch daily.   Bowel Regimen  - Continue Miralax 17G PO daily  - Continue Senna 2 tablets at bedtime for constipation  - Continue Dulcolax 5mg PO daily PRN constipation  Mild pain (score 1-3)  - Non-pharmacological pain treatment recommendations  - Warm/ Cool packs PRN   - Repositioning, guided imagery, relaxation, distraction.  - Physical therapy OOB if no contraindications   Recommendations discussed with primary team and RN.
p/w cough, Sputum production, WBC 14  CT Chest shows Multifocal subsegmental consolidative changes with more prominent changes in the left lower lobe with associated areas of mucous plugging which although may be chronic in nature.   s/p Ceftriaxone, azithromycin, Tylenol, morphine in the ED  Start azithromycin 500mg qd + rocephin 1g qd  F/U strep ag, legionella, RVP, procalcitonin, mycoplasma igm  Oxygen supplementation as needed  Obtain ambulatory O2 sat

## 2024-04-06 NOTE — PROGRESS NOTE ADULT - SUBJECTIVE AND OBJECTIVE BOX
NP Note discussed with  primary attending    Patient is a 79y old  Female who presents with a chief complaint of Pneumonia/rib fracture (05 Apr 2024 11:20)      INTERVAL HPI/OVERNIGHT EVENTS: no new complaints    MEDICATIONS  (STANDING):  acetaminophen     Tablet .. 1000 milliGRAM(s) Oral every 8 hours  atorvastatin 40 milliGRAM(s) Oral at bedtime  azithromycin  IVPB 500 milliGRAM(s) IV Intermittent every 24 hours  cefTRIAXone   IVPB 1000 milliGRAM(s) IV Intermittent every 24 hours  enoxaparin Injectable 40 milliGRAM(s) SubCutaneous every 24 hours  lactated ringers. 1000 milliLiter(s) (75 mL/Hr) IV Continuous <Continuous>  lidocaine   4% Patch 1 Patch Transdermal daily  metoprolol tartrate 12.5 milliGRAM(s) Oral every 12 hours  naloxone Injectable 0.4 milliGRAM(s) IV Push once  naproxen 250 milliGRAM(s) Oral two times a day  pantoprazole    Tablet 40 milliGRAM(s) Oral before breakfast  polyethylene glycol 3350 17 Gram(s) Oral daily  senna 2 Tablet(s) Oral at bedtime    MEDICATIONS  (PRN):  bisacodyl 5 milliGRAM(s) Oral daily PRN Constipation  guaiFENesin ER 1200 milliGRAM(s) Oral every 12 hours PRN Cough  oxyCODONE    IR 5 milliGRAM(s) Oral every 6 hours PRN Severe Pain (7 - 10)      __________________________________________________  REVIEW OF SYSTEMS: Unable to elicit ROS 2/2 impaired cognition    CONSTITUTIONAL: No fever,     Vital Signs Last 24 Hrs  T(C): 36.4 (05 Apr 2024 12:51), Max: 36.8 (05 Apr 2024 05:03)  T(F): 97.6 (05 Apr 2024 12:51), Max: 98.3 (05 Apr 2024 05:03)  HR: 60 (05 Apr 2024 12:51) (56 - 60)  BP: 146/65 (05 Apr 2024 12:51) (116/68 - 149/61)  BP(mean): 80 (05 Apr 2024 05:03) (80 - 81)  RR: 17 (05 Apr 2024 12:51) (17 - 20)  SpO2: 95% (05 Apr 2024 12:51) (92% - 95%)    Parameters below as of 05 Apr 2024 12:51  Patient On (Oxygen Delivery Method): room air        ________________________________________________  PHYSICAL EXAM:  GENERAL: NAD  HEENT: Normocephalic;  conjunctivae and sclerae clear; moist mucous membranes;   NECK : supple  CHEST/LUNG: Clear to ausculitation bilaterally with good air entry   HEART: S1 S2  regular; no murmurs, gallops or rubs  ABDOMEN: Soft, Nontender, Nondistended; Bowel sounds present  EXTREMITIES: no cyanosis; no edema; no calf tenderness  SKIN: warm and dry; no rash  NERVOUS SYSTEM:  A&Ox2    _________________________________________________  LABS:                        13.4   8.07  )-----------( 263      ( 05 Apr 2024 07:00 )             40.4     04-05    138  |  108  |  12  ----------------------------<  118<H>  3.7   |  26  |  0.68    Ca    8.8      05 Apr 2024 07:00  Phos  3.0     04-04  Mg     1.7     04-04    TPro  7.1  /  Alb  3.2<L>  /  TBili  0.8  /  DBili  x   /  AST  15  /  ALT  20  /  AlkPhos  92  04-04      Urinalysis Basic - ( 05 Apr 2024 07:00 )    Color: x / Appearance: x / SG: x / pH: x  Gluc: 118 mg/dL / Ketone: x  / Bili: x / Urobili: x   Blood: x / Protein: x / Nitrite: x   Leuk Esterase: x / RBC: x / WBC x   Sq Epi: x / Non Sq Epi: x / Bacteria: x      CAPILLARY BLOOD GLUCOSE            RADIOLOGY & ADDITIONAL TESTS:  < from: CT Head No Cont (04.03.24 @ 16:31) >  IMPRESSION:  CT HEAD: There is no acute intracranial hemorrhage or depressed calvarial   fracture.    CT CERVICAL SPINE: No fracture or acute traumatic malalignment.      < end of copied text >    Imaging Personally Reviewed:  YES    Consultant(s) Notes Reviewed:   YES    Care Discussed with Consultants :     Plan of care was discussed with patient and /or primary care giver; all questions and concerns were addressed and care was aligned with patient's wishes.    
  Source of information: ETIENNE CHAUHAN, Chart review  Patient language: Macanese  : # [id number] Terrie     HPI:  79-year-old female from home, ambulates with a walker at baseline, with HHA 10 hours/day x 7 days with PMH of HTN HLD depression presents to the ED complaining of left-sided rib cage pain s/p a fall out of bed 4 days ago.  Patient states the she fell out of bed while rolling out on Friday.  She has been having severe rib cage pain since then.  She complains of difficulty breathing due to pain.  She was able to get herself up.  As per HHA, patient has been having coughing, sputum production since Friday as well.  She denies any fever, headache, dizziness, chest pain, palpitation, nausea, vomiting, diarrhea, weakness, tingling, numbness.  In the ED, patient is comfortable, NAD, VSS, patient was found to have tenderness on palpation of the left posterior and lateral rib cage, saturating well on room air, patient was found to have WBC 14.8, CT head and cervical spine are unremarkable.  CT chest shows Displaced posterior eighth through 10th left left-sided rib fractures. No pneumothorax. Small left pleural effusion. Multifocal subsegmental consolidative changes with more prominent changes in the left lower lobe with associated areas of mucous plugging which although may be chronic in nature.  Patient received azithromycin, ceftriaxone, Tylenol, morphine in the ED.  Patient is admitted for pneumonia, acute rib fracture. (03 Apr 2024 18:59)      Pt is admitted for PNA and rib fractures.  CT chest- Displaced posterior eighth through 10th left left-sided rib fractures. No pneumothorax. Small left pleural effusion.  Multifocal subsegmental consolidative changes with more prominent changes in the left lower lobe with associated areas of mucous plugging which   although may be chronic in nature, recommend comparison to outside imaging and short-term follow-up CT in 4-6 weeks.  Pain consulted 4/3. Pt seen and examined at bedside. Pt laying in bed, reports left rib pain score 9/10  SCALE USED: (1-10 VNRS). Pt describes pain as aching, radiating from left lateral ribs to back, alleviated by pain medication, exacerbated by movement. Pt tolerating PO diet. Denies lethargy, chest pain, SOB, nausea, vomiting, constipation. Patient stated goal for pain control: to be able to take deep breaths, get out of bed to chair and ambulate with tolerable pain control. Reports ambulating to the bathroom. Pt denies taking medications for pain at home.     PAST MEDICAL & SURGICAL HISTORY:  HTN (hypertension)      HLD (hyperlipidemia)      Anxiety and depression      S/P bone marrow transplant          FAMILY HISTORY:  No pertinent family history in first degree relatives        Social History:  Lives at home, has home health aide 10 hours/day x 7 days, ambulates with a walker at baseline (03 Apr 2024 18:59)   [X] Denies ETOH use, illicit drug use and smoking    Allergies    No Known Allergies    MEDICATIONS  (STANDING):  acetaminophen     Tablet .. 1000 milliGRAM(s) Oral every 8 hours  atorvastatin 40 milliGRAM(s) Oral at bedtime  azithromycin  IVPB 500 milliGRAM(s) IV Intermittent every 24 hours  cefTRIAXone   IVPB 1000 milliGRAM(s) IV Intermittent every 24 hours  enoxaparin Injectable 40 milliGRAM(s) SubCutaneous every 24 hours  lactated ringers. 1000 milliLiter(s) (75 mL/Hr) IV Continuous <Continuous>  lidocaine   4% Patch 1 Patch Transdermal daily  metoprolol tartrate 12.5 milliGRAM(s) Oral every 12 hours  naloxone Injectable 0.4 milliGRAM(s) IV Push once  polyethylene glycol 3350 17 Gram(s) Oral daily  senna 2 Tablet(s) Oral at bedtime    MEDICATIONS  (PRN):  bisacodyl 5 milliGRAM(s) Oral daily PRN Constipation  guaiFENesin ER 1200 milliGRAM(s) Oral every 12 hours PRN Cough  oxyCODONE    IR 5 milliGRAM(s) Oral every 4 hours PRN Severe Pain (7 - 10)      Vital Signs Last 24 Hrs  T(C): 36.8 (05 Apr 2024 05:03), Max: 36.8 (05 Apr 2024 05:03)  T(F): 98.3 (05 Apr 2024 05:03), Max: 98.3 (05 Apr 2024 05:03)  HR: 59 (05 Apr 2024 05:03) (56 - 78)  BP: 149/61 (05 Apr 2024 05:03) (116/68 - 149/61)  BP(mean): 80 (05 Apr 2024 05:03) (80 - 81)  RR: 18 (05 Apr 2024 05:03) (18 - 20)  SpO2: 92% (05 Apr 2024 05:03) (92% - 94%)    Parameters below as of 05 Apr 2024 05:03  Patient On (Oxygen Delivery Method): nasal cannula  O2 Flow (L/min): 2    SARS-CoV-2: NotDetec (03 Apr 2024 22:35)    LABS: Reviewed                          13.4   8.07  )-----------( 263      ( 05 Apr 2024 07:00 )             40.4     04-05    138  |  108  |  12  ----------------------------<  118<H>  3.7   |  26  |  0.68    Ca    8.8      05 Apr 2024 07:00  Phos  3.0     04-04  Mg     1.7     04-04    TPro  7.1  /  Alb  3.2<L>  /  TBili  0.8  /  DBili  x   /  AST  15  /  ALT  20  /  AlkPhos  92  04-04      LIVER FUNCTIONS - ( 04 Apr 2024 06:55 )  Alb: 3.2 g/dL / Pro: 7.1 g/dL / ALK PHOS: 92 U/L / ALT: 20 U/L DA / AST: 15 U/L / GGT: x           Urinalysis Basic - ( 05 Apr 2024 07:00 )    Color: x / Appearance: x / SG: x / pH: x  Gluc: 118 mg/dL / Ketone: x  / Bili: x / Urobili: x   Blood: x / Protein: x / Nitrite: x   Leuk Esterase: x / RBC: x / WBC x   Sq Epi: x / Non Sq Epi: x / Bacteria: x    CAPILLARY BLOOD GLUCOSE    SARS-CoV-2: NotDetec (03 Apr 2024 22:35)    Radiology: Reviewed.   < from: CT Head No Cont (04.03.24 @ 16:31) >    ACC: [id number] EXAM:  CT CERVICAL SPINE   ORDERED BY: HERMANN SOMMERS     ACC: [id number] EXAM:  CT BRAIN   ORDERED BY: HERMANN SOMMERS     PROCEDURE DATE:  04/03/2024          INTERPRETATION:  CT OF THE HEAD WITHOUT CONTRAST  CT CERVICAL SPINE WITHOUT CONTRAST    CLINICAL INDICATION: fall left rib pain.    TECHNIQUE: Volumetric CT acquisition was performed through the brain and   reviewed using brain and bone window technique. Dose optimization   techniques were utilized including kVp/mA modulation along with iterative   reconstructions.  Thin section CT images were obtained through cervical spine with   overlapping reconstructions.  Sagittal, coronal and bilateral oblique 2D   reformats were then generated from the initial images. 3-D   reconstructions of the cervical spine was performed on a separate   workstation and reviewed.    COMPARISON: No prior studies have been submitted for comparison.    FINDINGS:  CT head:  The ventricular and sulcal size and configuration is age appropriate.     There is no acute loss of gray-white differentiation. There are mild   patchy areas of hypodensity in the periventricular and subcortical white   matter which are likely related to chronic microangiopathic changes.    There is no evidence of mass effect, midline shift, acute intracranial   hemorrhage, or extra-axial collections.     The calvarium is intact. Partial opacification of the right sphenoid   sinus.The mastoid air cells are predominantly clear. There has been prior  bilateral cataract surgery.      CT cervical spine:      There is straightening of usual cervical lordosis. There is no   significant subluxation. Vertebral body height is preserved throughout   the cervical spine. There is no significant loss of intervertebral disc   height throughout the cervical spine.    No significant disc bulge or herniation. No definite high-grade central   canal stenosis. The paravertebral soft tissues are unremarkable.      IMPRESSION:  CT HEAD: There is no acute intracranial hemorrhage or depressed calvarial   fracture.    CT CERVICAL SPINE: No fracture or acute traumatic malalignment.    --- End of Report ---            JASON ZACARIAS MD; Attending Radiologist  This document has been electronically signed. Apr3 2024  4:51PM    < end of copied text >    < from: CT Chest No Cont (04.03.24 @ 16:31) >    ACC: [id number] EXAM:  CT CHEST   ORDERED BY: HERMANN SOMMERS     PROCEDURE DATE:  04/03/2024          INTERPRETATION:  CLINICAL INFORMATION: Left-sided rib pain    COMPARISON: None.    CONTRAST/COMPLICATIONS:  IV Contrast: NONE  Oral Contrast: NONE  Complications: None reported at time of study completion    PROCEDURE:  CT of the Chest was performed.  Sagittal and coronal reformats were performed.    FINDINGS:    LUNGS AND AIRWAYS: Patent central airways.  Subsegmental atelectasis and   or scarring in the medial right upper lobe, lingula and anterior right   lower lobe. Biapical scarring. Subsegmental consolidative changes in the   left lower lobe may represent atelectasis and/or scarring however   recommend comparison to outside imaging and short-term follow-up in 4-6   weeks.  PLEURA: Small left pleural effusion.  MEDIASTINUM AND ARSH: No lymphadenopathy.  VESSELS: Atherosclerotic changes of the aorta and coronary vasculature.  HEART: Heart size is normal. No pericardial effusion.  CHEST WALL AND LOWER NECK: Partially visualized left posterior back   intramuscular lipoma. Subcentimeter thyroid nodule  VISUALIZED UPPER ABDOMEN: Left adrenal adenoma. A few hepatic cysts.   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 Colonic diverticulosis.  BONES: Degenerative changes. Displaced posterior left eighth through 10th   rib fractures.    IMPRESSION:  Displaced posterior eighth through 10th left left-sided rib fractures. No   pneumothorax. Small left pleural effusion.    Multifocal subsegmental consolidative changes with more prominent changes   in the left lower lobe with associated areas of mucous plugging which   although may be chronic in nature, recommend comparison to outside   imaging and short-term follow-up CT in 4-6 weeks.    --- End of Report ---            NGA PAZ MD; Attending Radiologist  This document has been electronically signed. Apr  3 2024  5:18PM    < end of copied text >    ORT Score -   Family Hx of substance abuse	Female	      Male  Alcohol 	                                           1                     3  Illegal drugs	                                   2                     3  Rx drugs                                           4 	                  4  Personal Hx of substance abuse		  Alcohol 	                                          3	                  3  Illegal drugs                                     4	                  4  Rx drugs                                            5 	                  5  Age between 16- 45 years	           1                     1  hx preadolescent sexual abuse	   3 	                  0  Psychological disease		  ADD, OCD, bipolar, schizophrenia   2	          2  Depression                                           1 	          1  Total: 0    a score of 3 or lower indicates low risk for opioid abuse		  a score of 4-7 indicates moderate risk for opioid abuse		  a score of 8 or higher indicates high risk for opioid abuse    REVIEW OF SYSTEMS:  CONSTITUTIONAL: No fever or fatigue  HEENT:  No difficulty hearing, no change in vision  NECK: No pain or stiffness  RESPIRATORY: No cough, wheezing, chills or hemoptysis; No shortness of breath  CARDIOVASCULAR: No chest pain, palpitations, dizziness, or leg swelling  GASTROINTESTINAL: No loss of appetite, decreased PO intake. No abdominal or epigastric pain. No nausea, vomiting; No diarrhea or constipation.   GENITOURINARY: No dysuria, frequency, hematuria, retention or incontinence  MUSCULOSKELETAL: + left rib cage pain. No joint swelling; No back pain, no upper or lower motor strength weakness, no saddle anesthesia, bowel/bladder incontinence, + falls   NEURO: No headaches, No numbness/tingling b/l LE, No weakness  PSYCHIATRIC: + hx dementia, anxiety/ depression     PHYSICAL EXAM:  GENERAL:  Awake, confused, & Oriented X 1 to self, cooperative, NAD, Good concentration. Speech is clear.   RESPIRATORY: Respirations even and unlabored. Clear to auscultation bilaterally; No rales, rhonchi, wheezing, or rubs  CARDIOVASCULAR: Normal S1/S2, regular rate and rhythm; No murmurs, rubs, or gallops. No JVD.   GASTROINTESTINAL:  Soft, Nontender, Nondistended; Bowel sounds present  PERIPHERAL VASCULAR:  Extremities warm without edema. 2+ Peripheral Pulses, No cyanosis, No calf tenderness  MUSCULOSKELETAL: Motor Strength 5/5 B/L upper and lower extremities; moves all extremities equally against gravity; ROM intact; + left lower mid- posterior rib cage tenderness on palpation   SKIN: Warm, dry, intact. No rashes, lesions, scars or wounds.     Risk factors associated with adverse outcomes related to opioid treatment  [ ]  Concurrent benzodiazepine use  [ ]  History/ Active substance use or alcohol use disorder  [X ] Psychiatric co-morbidity  [ ] Sleep apnea  [ ] COPD  [ ] BMI> 35  [ ] Liver dysfunction  [ ] Renal dysfunction  [ ] CHF  [ ] Smoker  [X ]  Age > 60 years    [X ]  [place]  Reviewed and Copied to Chart. See below.    Plan of care and goal oriented pain management treatment options were discussed with patient and /or primary care giver; all questions and concerns were addressed and care was aligned with patient's wishes.    Educated patient on goal oriented pain management treatment options     04-05-24 @ 11:21  
Source of information: ETIENNE CHAUHAN, Chart review  Patient language: Malay  : Leland ID# [id number]    HPI:  79-year-old female from home, ambulates with a walker at baseline, with HHA 10 hours/day x 7 days with PMH of HTN HLD depression presents to the ED complaining of left-sided rib cage pain s/p a fall out of bed 4 days ago.  Patient states the she fell out of bed while rolling out on Friday.  She has been having severe rib cage pain since then.  She complains of difficulty breathing due to pain.  She was able to get herself up.  As per HHA, patient has been having coughing, sputum production since Friday as well.  She denies any fever, headache, dizziness, chest pain, palpitation, nausea, vomiting, diarrhea, weakness, tingling, numbness.  In the ED, patient is comfortable, NAD, VSS, patient was found to have tenderness on palpation of the left posterior and lateral rib cage, saturating well on room air, patient was found to have WBC 14.8, CT head and cervical spine are unremarkable.  CT chest shows Displaced posterior eighth through 10th left left-sided rib fractures. No pneumothorax. Small left pleural effusion. Multifocal subsegmental consolidative changes with more prominent changes in the left lower lobe with associated areas of mucous plugging which although may be chronic in nature.  Patient received azithromycin, ceftriaxone, Tylenol, morphine in the ED.  Patient is admitted for pneumonia, acute rib fracture. (03 Apr 2024 18:59)      This is a Patient is a 79y old  Female who presents with a chief complaint of Pneumonia/rib fracture (05 Apr 2024 16:06)    Pt is admitted for PNA and rib fractures.  CT chest- Displaced posterior eighth through 10th left left-sided rib fractures. No pneumothorax. Small left pleural effusion. Multifocal subsegmental consolidative changes with more prominent changes in the left lower lobe with associated areas of mucous plugging which although may be chronic in nature, recommend comparison to outside imaging and short-term follow-up CT in 4-6 weeks. Pain service consulted on 4/3. Pt seen and examined at bedside. Observed patient ambulating in room with slow steady gait.  Reports pain score 5/10  SCALE USED: (1-10 VNRS). Pt describes pain as tolerable and that the pain medication is helping to manage her pain adequately. Pain is described as aching to palpation and is non- radiating. Pt tolerating PO diet. Denies lethargy, chest pain, SOB, nausea, vomiting, constipation. Last BM 4/3. Patient stated goal for pain control: to be able to take deep breaths, get out of bed to chair and ambulate with tolerable pain control. Pt denies taking medications for pain at home.     PAST MEDICAL & SURGICAL HISTORY:    HTN (hypertension)    HLD (hyperlipidemia)    Anxiety and depression    S/P bone marrow transplant    FAMILY HISTORY:  No pertinent family history in first degree relatives    Social History:  Lives at home, has home health aide 10 hours/day x 7 days, ambulates with a walker at baseline (03 Apr 2024 18:59)  [X]Denies ETOH use, illicit drug use, and smoking     Allergies  No Known Allergies    MEDICATIONS  (STANDING):  acetaminophen     Tablet .. 1000 milliGRAM(s) Oral every 8 hours  atorvastatin 40 milliGRAM(s) Oral at bedtime  cefTRIAXone   IVPB 1000 milliGRAM(s) IV Intermittent every 24 hours  enoxaparin Injectable 40 milliGRAM(s) SubCutaneous every 24 hours  lactated ringers. 1000 milliLiter(s) (75 mL/Hr) IV Continuous <Continuous>  lidocaine   4% Patch 1 Patch Transdermal daily  metoprolol tartrate 12.5 milliGRAM(s) Oral every 12 hours  naloxone Injectable 0.4 milliGRAM(s) IV Push once  naproxen 250 milliGRAM(s) Oral two times a day  pantoprazole    Tablet 40 milliGRAM(s) Oral before breakfast  polyethylene glycol 3350 17 Gram(s) Oral daily  senna 2 Tablet(s) Oral at bedtime    MEDICATIONS  (PRN):  bisacodyl 5 milliGRAM(s) Oral daily PRN Constipation  guaiFENesin ER 1200 milliGRAM(s) Oral every 12 hours PRN Cough  oxyCODONE    IR 5 milliGRAM(s) Oral every 6 hours PRN Severe Pain (7 - 10)    Vital Signs Last 24 Hrs  T(C): 36.5 (06 Apr 2024 06:11), Max: 36.6 (05 Apr 2024 21:23)  T(F): 97.7 (06 Apr 2024 06:11), Max: 97.9 (05 Apr 2024 21:23)  HR: 60 (06 Apr 2024 06:11) (54 - 60)  BP: 159/77 (06 Apr 2024 06:11) (113/57 - 159/77)  BP(mean): --  RR: 18 (06 Apr 2024 06:11) (18 - 18)  SpO2: 95% (06 Apr 2024 06:11) (95% - 97%)    Parameters below as of 06 Apr 2024 06:11  Patient On (Oxygen Delivery Method): nasal cannula  O2 Flow (L/min): 2    SARS-CoV-2: NotDetec (03 Apr 2024 22:35)    LABS: Reviewed               13.4   8.07  )-----------( 263      ( 05 Apr 2024 07:00 )             40.4     04-05    138  |  108  |  12  ----------------------------<  118<H>  3.7   |  26  |  0.68    Ca    8.8      05 Apr 2024 07:00    Urinalysis Basic - ( 05 Apr 2024 07:00 )    Color: x / Appearance: x / SG: x / pH: x  Gluc: 118 mg/dL / Ketone: x  / Bili: x / Urobili: x   Blood: x / Protein: x / Nitrite: x   Leuk Esterase: x / RBC: x / WBC x   Sq Epi: x / Non Sq Epi: x / Bacteria: x    CAPILLARY BLOOD GLUCOSE    SARS-CoV-2: NotDetec (03 Apr 2024 22:35)    Radiology: Reviewed    < from: CT Chest No Cont (04.03.24 @ 16:31) >    ACC: [id number] EXAM:  CT CHEST   ORDERED BY: HERMANN SOMMERS     PROCEDURE DATE:  04/03/2024      INTERPRETATION:  CLINICAL INFORMATION: Left-sided rib pain    COMPARISON: None.    CONTRAST/COMPLICATIONS:  IV Contrast: NONE  Oral Contrast: NONE  Complications: None reported at time of study completion    PROCEDURE:  CT of the Chest was performed.  Sagittal and coronal reformats were performed.    FINDINGS:    LUNGS AND AIRWAYS: Patent central airways.  Subsegmental atelectasis and   or scarring in the medial right upper lobe, lingula and anterior right   lower lobe. Biapical scarring. Subsegmental consolidative changes in the   left lower lobe may represent atelectasis and/or scarring however   recommend comparison to outside imaging and short-term follow-up in 4-6   weeks.  PLEURA: Small left pleural effusion.  MEDIASTINUM AND ARSH: No lymphadenopathy.  VESSELS: Atherosclerotic changes of the aorta and coronary vasculature.  HEART: Heart size is normal. No pericardial effusion.  CHEST WALL AND LOWER NECK: Partially visualized left posterior back   intramuscular lipoma. Subcentimeter thyroid nodule  VISUALIZED UPPER ABDOMEN: Left adrenal adenoma. A few hepatic cysts.   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 Colonic diverticulosis.  BONES: Degenerative changes. Displaced posterior left eighth through 10th   rib fractures.    IMPRESSION:  Displaced posterior eighth through 10th left left-sided rib fractures. No   pneumothorax. Small left pleural effusion.    Multifocal subsegmental consolidative changes with more prominent changes   in the left lower lobe with associated areas of mucous plugging which   although may be chronic in nature, recommend comparison to outside   imaging and short-term follow-up CT in 4-6 weeks.    --- End of Report ---      NGA PAZ MD; Attending Radiologist  This document has been electronically signed. Apr  3 2024  5:18PM    < end of copied text >    < from: CT Head No Cont (04.03.24 @ 16:31) >    ACC: [id number] EXAM:  CT CERVICAL SPINE   ORDERED BY: HERMANN SOMMERS     ACC: [id number] EXAM:  CT BRAIN   ORDERED BY: HERMANN SOMMERS     PROCEDURE DATE:  04/03/2024      INTERPRETATION:  CT OF THE HEAD WITHOUT CONTRAST  CT CERVICAL SPINE WITHOUT CONTRAST    CLINICAL INDICATION: fall left rib pain.    TECHNIQUE: Volumetric CT acquisition was performed through the brain and   reviewed using brain and bone window technique. Dose optimization   techniques were utilized including kVp/mA modulation along with iterative   reconstructions.  Thin section CT images were obtained through cervical spine with   overlapping reconstructions.  Sagittal, coronal and bilateral oblique 2D   reformats were then generated from the initial images. 3-D   reconstructions of the cervical spine was performed on a separate   workstation and reviewed.    COMPARISON: No prior studies have been submitted for comparison.    FINDINGS:  CT head:  The ventricular and sulcal size and configuration is age appropriate.     There is no acute loss of gray-white differentiation. There are mild   patchy areas of hypodensity in the periventricular and subcortical white   matter which are likely related to chronic microangiopathic changes.    There is no evidence of mass effect, midline shift, acute intracranial   hemorrhage, or extra-axial collections.     The calvarium is intact. Partial opacification of the right sphenoid   sinus.The mastoid air cells are predominantly clear. There has been prior  bilateral cataract surgery.    CT cervical spine:    There is straightening of usual cervical lordosis. There is no   significant subluxation. Vertebral body height is preserved throughout   the cervical spine. There is no significant loss of intervertebral disc   height throughout the cervical spine.    No significant disc bulge or herniation. No definite high-grade central   canal stenosis. The paravertebral soft tissues are unremarkable.    IMPRESSION:  CT HEAD: There is no acute intracranial hemorrhage or depressed calvarial   fracture.    CT CERVICAL SPINE: No fracture or acute traumatic malalignment.    --- End of Report ---    JASON ZACARIAS MD; Attending Radiologist  This document has been electronically signed. Apr3 2024  4:51PM    < end of copied text >      ORT Score -   Family Hx of substance abuse	Female	      Male  Alcohol 	                                           1                     3  Illegal drugs	                                   2                     3  Rx drugs                                           4 	                  4  Personal Hx of substance abuse		  Alcohol 	                                          3	                  3  Illegal drugs                                     4	                  4  Rx drugs                                            5 	                  5  Age between 16- 45 years	           1                     1  hx preadolescent sexual abuse	   3 	                  0  Psychological disease		  ADD, OCD, bipolar, schizophrenia   2	          2  Depression/anxiety                                          1 	          1  Total: 1    a score of 3 or lower indicates low risk for opioid abuse		  a score of 4-7 indicates moderate risk for opioid abuse		  a score of 8 or higher indicates high risk for opioid abuse    REVIEW OF SYSTEMS:  CONSTITUTIONAL: No fever or fatigue  HEENT:  No difficulty hearing, no change in vision  NECK: No pain or stiffness  RESPIRATORY: No cough, wheezing, chills or hemoptysis; No shortness of breath  CARDIOVASCULAR: No chest pain, palpitations, dizziness, or leg swelling  GASTROINTESTINAL: No loss of appetite, decreased PO intake. No abdominal or epigastric pain. No nausea, vomiting; No diarrhea or constipation.   GENITOURINARY: No dysuria, frequency, hematuria, retention or incontinence  MUSCULOSKELETAL: + left rib cage pain. No joint swelling; No back pain, no upper or lower motor strength weakness, no saddle anesthesia, bowel/bladder incontinence, + falls   NEURO: No headaches, No numbness/tingling b/l LE, No weakness  PSYCHIATRIC: + hx dementia, anxiety/depression     PHYSICAL EXAM:  GENERAL:  Alert & Oriented X 2 - disoriented to time, cooperative, NAD, Good concentration. Speech is clear.   RESPIRATORY: Respirations even and unlabored. Clear to auscultation bilaterally; No rales, rhonchi, wheezing, or rubs  CARDIOVASCULAR: Normal S1/S2, regular rate and rhythm; No murmurs, rubs, or gallops. No JVD.   GASTROINTESTINAL:  Soft, Nontender, Nondistended; Bowel sounds present  PERIPHERAL VASCULAR:  Extremities warm without edema. 2+ Peripheral Pulses, No cyanosis, No calf tenderness  MUSCULOSKELETAL: Motor Strength 5/5 B/L upper and lower extremities; moves all extremities equally against gravity; ROM intact; + left lower mid- posterior rib cage tenderness on palpation   SKIN: Warm, dry, intact. No rashes, lesions, scars or wounds.     Risk factors associated with adverse outcomes related to opioid treatment  [ ]  Concurrent benzodiazepine use  [ ]  History/ Active substance use or alcohol use disorder  [X] Psychiatric co-morbidity  [ ] Sleep apnea  [ ] COPD  [ ] BMI> 35  [ ] Liver dysfunction  [ ] Renal dysfunction  [ ] CHF  [ ] Smoker  [X ]  Age > 60 years    [X ]  [place]  Reviewed and Copied to Chart. See below.    Plan of care and goal oriented pain management treatment options were discussed with patient and /or primary care giver; all questions and concerns were addressed and care was aligned with patient's wishes.    Educated patient on goal oriented pain management treatment options     04-06-24 @ 12:57    
NP Note discussed with  primary attending    Patient is a 79y old  Female who presents with a chief complaint of Pneumonia/rib fracture (04 Apr 2024 14:24)      INTERVAL HPI/OVERNIGHT EVENTS: no new complaints    MEDICATIONS  (STANDING):  acetaminophen     Tablet .. 1000 milliGRAM(s) Oral every 8 hours  atorvastatin 40 milliGRAM(s) Oral at bedtime  azithromycin  IVPB 500 milliGRAM(s) IV Intermittent every 24 hours  cefTRIAXone   IVPB 1000 milliGRAM(s) IV Intermittent every 24 hours  enoxaparin Injectable 40 milliGRAM(s) SubCutaneous every 24 hours  lactated ringers. 1000 milliLiter(s) (75 mL/Hr) IV Continuous <Continuous>  lidocaine   4% Patch 1 Patch Transdermal daily  metoprolol tartrate 12.5 milliGRAM(s) Oral every 12 hours  naloxone Injectable 0.4 milliGRAM(s) IV Push once  polyethylene glycol 3350 17 Gram(s) Oral daily  senna 2 Tablet(s) Oral at bedtime    MEDICATIONS  (PRN):  bisacodyl 5 milliGRAM(s) Oral daily PRN Constipation  guaiFENesin ER 1200 milliGRAM(s) Oral every 12 hours PRN Cough  oxyCODONE    IR 5 milliGRAM(s) Oral every 4 hours PRN Severe Pain (7 - 10)      __________________________________________________  REVIEW OF SYSTEMS:    CONSTITUTIONAL: No fever,   EYES: no acute visual disturbances  NECK: No pain or stiffness  RESPIRATORY: No cough; No shortness of breath  CARDIOVASCULAR: No chest pain, no palpitations  GASTROINTESTINAL: No pain. No nausea or vomiting; No diarrhea   NEUROLOGICAL: No headache or numbness, no tremors  MUSCULOSKELETAL: No joint pain, no muscle pain  GENITOURINARY: no dysuria, no frequency, no hesitancy  PSYCHIATRY: no depression , no anxiety  ALL OTHER  ROS negative        Vital Signs Last 24 Hrs  T(C): 36.5 (04 Apr 2024 14:30), Max: 36.5 (04 Apr 2024 14:30)  T(F): 97.7 (04 Apr 2024 14:30), Max: 97.7 (04 Apr 2024 14:30)  HR: 78 (04 Apr 2024 14:30) (58 - 78)  BP: 128/89 (04 Apr 2024 14:30) (128/89 - 149/77)  BP(mean): 81 (04 Apr 2024 02:46) (81 - 81)  RR: 20 (04 Apr 2024 14:30) (20 - 20)  SpO2: 92% (04 Apr 2024 14:30) (92% - 96%)    Parameters below as of 04 Apr 2024 14:30  Patient On (Oxygen Delivery Method): room air        ________________________________________________  PHYSICAL EXAM:  GENERAL: NAD  HEENT: Normocephalic;  conjunctivae and sclerae clear; moist mucous membranes;   NECK : supple  CHEST/LUNG: Clear to ausculitation bilaterally with good air entry   HEART: S1 S2  regular; no murmurs, gallops or rubs  ABDOMEN: Soft, Nontender, Nondistended; Bowel sounds present  EXTREMITIES: no cyanosis; no edema; no calf tenderness  SKIN: warm and dry; no rash  NERVOUS SYSTEM:  Awake and alert; Oriented  to place, person and time ; no new deficits    _________________________________________________  LABS:                        14.6   11.06 )-----------( 279      ( 04 Apr 2024 06:55 )             43.3     04-04    134<L>  |  101  |  15  ----------------------------<  110<H>  3.4<L>   |  29  |  0.75    Ca    8.7      04 Apr 2024 06:55  Phos  3.0     04-04  Mg     1.7     04-04    TPro  7.1  /  Alb  3.2<L>  /  TBili  0.8  /  DBili  x   /  AST  15  /  ALT  20  /  AlkPhos  92  04-04      Urinalysis Basic - ( 04 Apr 2024 06:55 )    Color: x / Appearance: x / SG: x / pH: x  Gluc: 110 mg/dL / Ketone: x  / Bili: x / Urobili: x   Blood: x / Protein: x / Nitrite: x   Leuk Esterase: x / RBC: x / WBC x   Sq Epi: x / Non Sq Epi: x / Bacteria: x      CAPILLARY BLOOD GLUCOSE            RADIOLOGY & ADDITIONAL TESTS:  < from: CT Head No Cont (04.03.24 @ 16:31) >  IMPRESSION:  CT HEAD: There is no acute intracranial hemorrhage or depressed calvarial   fracture.    CT CERVICAL SPINE: No fracture or acute traumatic malalignment.      < end of copied text >  < from: CT Chest No Cont (04.03.24 @ 16:31) >    IMPRESSION:  Displaced posterior eighth through 10th left left-sided rib fractures. No   pneumothorax. Small left pleural effusion.    Multifocal subsegmental consolidative changes with more prominent changes   in the left lower lobe with associated areas of mucous plugging which   although may be chronic in nature, recommend comparison to outside   imaging and short-term follow-up CT in 4-6 weeks.    --- End of Report ---      < end of copied text >    Imaging Personally Reviewed:  YES    Consultant(s) Notes Reviewed:   YES    Care Discussed with Consultants :     Plan of care was discussed with patient and /or primary care giver; all questions and concerns were addressed and care was aligned with patient's wishes.

## 2024-04-08 ENCOUNTER — TRANSCRIPTION ENCOUNTER (OUTPATIENT)
Age: 79
End: 2024-04-08

## 2024-04-10 ENCOUNTER — TRANSCRIPTION ENCOUNTER (OUTPATIENT)
Age: 79
End: 2024-04-10

## 2024-04-17 ENCOUNTER — TRANSCRIPTION ENCOUNTER (OUTPATIENT)
Age: 79
End: 2024-04-17

## 2024-04-19 ENCOUNTER — TRANSCRIPTION ENCOUNTER (OUTPATIENT)
Age: 79
End: 2024-04-19

## 2025-06-30 NOTE — DISCHARGE NOTE PROVIDER - HOSPITAL COURSE
Anesthesia Pre Eval Note    Anesthesia ROS/Med Hx    Overall Review:  EKG was reviewed and Echo was reviewed     Anesthetic Complication History:    Patient does not have a history of anesthetic complications      Pulmonary Review:  Patient does not have a pulmonary history      Neuro/Psych Review:       Positive for psychiatric history - Depression    Cardiovascular Review:  Patient does not have a cardiovascular history   Exercise tolerance: good (>4 METS)    GI/HEPATIC/RENAL Review:   Positive for hiatal hernia  Positive for GERD - well controlled    End/Other Review:  Patient does not have an endo/other history    Additional Results:  EKG:  Encounter Date: 06/28/25  -Electrocardiogram 12-Lead:        Result                      Value                           Systolic Blood Pressure     150                             Diastolic Blood Pressu*     100                             Ventricular Rate EKG/M*     103                             Atrial Rate (BPM)           103                             NV-Interval (MSEC)          126                             QRS-Interval (MSEC)         80                              QT-Interval (MSEC)          348                             QTc                         455                             P Axis (Degrees)            68                              R Axis (Degrees)            77                              T Axis (Degrees)            62                              REPORT TEXT                                             Sinus tachycardia   Otherwise normal ECG   When compared with ECG of   28-JUN-2024 09:14,   No significant change was found   Confirmed by EDWIN REYNOSO, ZACK ([id number]),  Ulises Bender ([id number]) on 6/28/2025 4:56:00 PM    Echo:  Ejection Fraction       Date                     Value               Ref Range           Status                10/20/2022               64                  %                   Final            ----------   ALLERGIES:  No Known  Allergies   Last Labs        Component                Value               Date/Time                  WBC                      12.2 (H)            06/30/2025 0633            RBC                      3.58 (L)            06/30/2025 0633            HGB                      11.0 (L)            06/30/2025 0633            HCT                      32.7 (L)            06/30/2025 0633            MCV                      91.3                06/30/2025 0633            MCH                      30.7                06/30/2025 0633            MCHC                     33.6                06/30/2025 0633            RDW-CV                   12.0                06/30/2025 0633            Sodium                   142                 06/30/2025 0633            Potassium                3.0 (L)             06/30/2025 0633            Chloride                 105                 06/30/2025 0633            Carbon Dioxide           24                  06/30/2025 0633            Glucose                  89                  06/30/2025 0633            BUN                      11                  06/30/2025 0633            Creatinine               0.57                06/30/2025 0633            Glomerular Filtrati*     >90                 06/30/2025 0633            Calcium                  8.1 (L)             06/30/2025 0633            PLT                      261                 06/30/2025 0633        Past Medical History:  No date: Belching  No date: BMI 28.0-28.9,adult  No date: Depression  No date: Diverticula of colon  No date: EE (eosinophilic esophagitis)  No date: Epigastric discomfort  No date: Esophageal dysphagia  No date: Grade I internal hemorrhoids  No date: Intermittent dysphagia  No date: Nausea and vomiting in adult  No date: Seasonal allergies  No date: Vitamin D deficiency  No date: Wears glasses  No date: Wears prescription eyeglasses  Past Surgical History:  No date: Appendectomy  12/13/2019: Colonoscopy      Comment:    Gurdeep recommends follow up in 10 years  09/25/2024: Endoflip      Comment:  Esophageal balloon distensibility study  06/13/2022: Esophagogastroduodenoscopy      Comment:  Dr. Mackenzie  09/25/2024: Esophagogastroduodenoscopy  05/21/2021: Esophagogastroduodenoscopy (egd)  09/09/2024: Esophagogastroduodenoscopy transoral flex diag      Comment:  Dr. Mackenzie  No date: Hysterectomy  09/25/2024: Lap repair paraesophageal hernia w mesh      Comment:  Robotic assisted laparoscopic recurrent paraesophageal                hernia repair with mesh and revision of Nissen                fundoplication  10/19/2022: Laparoscopic nissen fundoplication  06/28/2024: Laparoscopy, cholecystectomy      Comment:  Dr. Yang  12/18/2013: Toe surgery; Right      Comment:  Right first metatarsophalangeal joint fusion  01/08/2025: Total knee replacement; Right      Comment:  with Dr. Evangelista Luong   Prior to Admission medications :  Medication UNKNOWN TO PATIENT, Sig Apply 120 g topically 4 times daily. Compound Cream: Ketamine 6%, cyclobenzaprine 2%, diclofenac 3%, pentoxifylline 4%, ibuprofen 1%, amitriptyline 2%, lidocaine 5%    Apply 1-2 grams to the affected area 4 times daily as needed., Start Date , End Date , Taking? Yes, Authorizing Provider Provider, Outside    Medication ibuprofen (MOTRIN) 400 MG tablet, Sig Take 400 mg by mouth every 6 hours as needed for Pain., Start Date , End Date , Taking? Yes, Authorizing Provider Provider, Outside    Medication traMADol (ULTRAM) 50 MG tablet, Sig Take 1-2 tablets by mouth every 6 hours as needed for Pain., Start Date 4/30/25, End Date , Taking? , Authorizing Provider Evangelista Luong MD    Medication celecoxib (CeleBREX) 100 MG capsule, Sig Take 1 capsule by mouth in the morning and 1 capsule in the evening.  Patient taking differently: Take 100 mg by mouth 2 times daily as needed., Start Date 3/10/25, End Date , Taking? , Authorizing Provider Evangelista Luong MD    Medication pramipexole  (MIRAPEX) 0.125 MG tablet, Sig Take 1 tablet by mouth at bedtime.  Patient taking differently: Take 0.125 mg by mouth at bedtime as needed. Indications: Restless Leg Syndrome, Start Date 11/15/24, End Date , Taking? , Authorizing Provider Suzy Forte NP        Social history reviewed:  Social History     Tobacco Use   Smoking Status Never    Passive exposure: Never   Smokeless Tobacco Never        E-Cigarette/Vaping Substances & Devices    E-Cigarette/Vaping Use Never Used        Social History     Substance and Sexual Activity   Alcohol Use Yes    Comment: socially           Relevant Problems   No relevant active problems       Physical Exam     Airway   Mallampati: II  TM Distance: >3 FB  Neck ROM: Full    Cardiovascular  Cardiovascular exam normal    Dental Exam  Dental exam normal    Pulmonary Exam  Pulmonary exam normal      Vitals:   Patient Vitals for the past 4 hrs (Last 1 readings):   BP Temp Temp src Pulse Resp SpO2   06/30/25 1104 (!) 145/81 37.1 °C (98.8 °F) Oral 76 20 97 %         Anesthesia Plan:    ASA Status: 2  Anesthesia Type: MAC    Induction: Intravenous  Preferred Airway Type: Nasal Cannula    Post-op Pain Management: Per Surgeon      Checklist  Reviewed: EKG, Lab Results, Past Med History, NPO Status, Anesthesia Record, Allergies, Medications and Problem list  Consent/Risks Discussed Statement:  The proposed anesthetic plan, including its risks and benefits, have been discussed with the Patient along with the risks and benefits of alternatives. Questions were encouraged and answered and the patient and/or representative understands and agrees to proceed.        I discussed with the patient (and/or patient's legal representative) the risks and benefits of the proposed anesthesia plan, MAC, which may include services performed by other anesthesia providers.    Alternative anesthesia plans, if available, were reviewed with the patient (and/or patient's legal representative). Discussion has  been held with the patient (and/or patient's legal representative) regarding risks of anesthesia, which include allergic reaction, aspiration, anxiety, conversion to general anesthesia, intra-operative awareness, vomiting, sore throat, nausea, dental injury and oral injury and emergent situations that may require change in anesthesia plan.    The patient (and/or patient's legal representative) has indicated understanding, his/her questions have been answered, and he/she wishes to proceed with the planned anesthetic.    Blood Products: Not Anticipated     79-year-old female from home, ambulates with a walker at baseline, with HHA 10 hours/day x 7 days with PMH of HTN HLD depression, dementia   presents to the ED complaining of left-sided rib cage pain s/p a fall out of bed 4 days ago.  In the ED, patient is comfortable, NAD, VSS, patient was   found to have tenderness on palpation of the left posterior and lateral rib cage, saturating well on room air, patient was found to have WBC 14.8,   CT head and cervical spine are unremarkable. No pneumothorax. Small left pleural effusion. Multifocal subsegmental consolidative changes   with more prominent changes in the left lower lobe with associated areas of mucous plugging which although may be chronic in nature.    Patient received azithromycin, ceftriaxone, Tylenol, morphine in the ED.    Patient is admitted for pneumonia, acute rib fracture. Patient started on IV Zithro & Rocephin.  Off note, pt is s/p fall at home prior to admission. CT chest shows displaced posterior eight thru   10th left sided rib fractures.     Thoracic surgery & recommended no acute thoracic intervention. Pain mgt consulted.   Patient is optimized for discharge with home PT.               79-year-old female from home, ambulates with a walker at baseline, with HHA 10 hours/day x 7 days with PMH of HTN HLD depression, dementia   presents to the ED complaining of left-sided rib cage pain s/p a fall out of bed 4 days ago.  In the ED, patient is comfortable, NAD, VSS, patient was   found to have tenderness on palpation of the left posterior and lateral rib cage, saturating well on room air, patient was found to have WBC 14.8,   CT head and cervical spine are unremarkable. No pneumothorax. Small left pleural effusion. Multifocal subsegmental consolidative changes   with more prominent changes in the left lower lobe with associated areas of mucous plugging which although may be chronic in nature.    Patient received azithromycin, ceftriaxone, Tylenol, morphine in the ED.    Patient is admitted for pneumonia, acute rib fracture. Patient started on IV Zithro & Rocephin.  Off note, pt is s/p fall at home prior to admission. CT chest shows displaced posterior eight thru   10th left sided rib fractures.     Thoracic surgery & recommended no acute thoracic intervention. Pain mgt consulted.   Patient is optimized for discharge with home PT.    Given patient's improved clinical status and current hemodynamic stability, decision was made to discharge.  Please refer to patient's complete medical chart with documents for a full hospital course, for this is only a brief summary.